# Patient Record
Sex: FEMALE | Race: BLACK OR AFRICAN AMERICAN | Employment: FULL TIME | URBAN - METROPOLITAN AREA
[De-identification: names, ages, dates, MRNs, and addresses within clinical notes are randomized per-mention and may not be internally consistent; named-entity substitution may affect disease eponyms.]

---

## 2018-09-29 ENCOUNTER — HOSPITAL ENCOUNTER (EMERGENCY)
Age: 38
Discharge: HOME | End: 2018-09-29
Attending: EMERGENCY MEDICINE
Payer: MEDICARE

## 2018-09-29 ENCOUNTER — APPOINTMENT (OUTPATIENT)
Dept: CT IMAGING | Age: 38
End: 2018-09-29
Payer: MEDICARE

## 2018-09-29 VITALS
HEART RATE: 88 BPM | DIASTOLIC BLOOD PRESSURE: 69 MMHG | SYSTOLIC BLOOD PRESSURE: 100 MMHG | OXYGEN SATURATION: 95 % | BODY MASS INDEX: 26.4 KG/M2 | RESPIRATION RATE: 16 BRPM | TEMPERATURE: 97.8 F | WEIGHT: 122 LBS

## 2018-09-29 DIAGNOSIS — N10 ACUTE PYELONEPHRITIS: Primary | ICD-10-CM

## 2018-09-29 LAB
ALBUMIN SERPL-MCNC: 4 G/DL (ref 3.5–5.2)
ALP BLD-CCNC: 106 U/L (ref 35–104)
ALT SERPL-CCNC: 18 U/L (ref 0–32)
ANION GAP SERPL CALCULATED.3IONS-SCNC: 14 MMOL/L (ref 7–16)
AST SERPL-CCNC: 17 U/L (ref 0–31)
BACTERIA: ABNORMAL /HPF
BASOPHILS ABSOLUTE: 0.13 E9/L (ref 0–0.2)
BASOPHILS RELATIVE PERCENT: 0.9 % (ref 0–2)
BILIRUB SERPL-MCNC: 0.8 MG/DL (ref 0–1.2)
BILIRUBIN URINE: NEGATIVE
BLOOD, URINE: ABNORMAL
BUN BLDV-MCNC: 12 MG/DL (ref 6–20)
CALCIUM SERPL-MCNC: 9.3 MG/DL (ref 8.6–10.2)
CHLORIDE BLD-SCNC: 101 MMOL/L (ref 98–107)
CHP ED QC CHECK: YES
CLARITY: ABNORMAL
CO2: 23 MMOL/L (ref 22–29)
COLOR: YELLOW
CREAT SERPL-MCNC: 0.6 MG/DL (ref 0.5–1)
EKG ATRIAL RATE: 101 BPM
EKG P AXIS: 50 DEGREES
EKG P-R INTERVAL: 144 MS
EKG Q-T INTERVAL: 328 MS
EKG QRS DURATION: 60 MS
EKG QTC CALCULATION (BAZETT): 425 MS
EKG R AXIS: 10 DEGREES
EKG T AXIS: 29 DEGREES
EKG VENTRICULAR RATE: 101 BPM
EOSINOPHILS ABSOLUTE: 0 E9/L (ref 0.05–0.5)
EOSINOPHILS RELATIVE PERCENT: 0.1 % (ref 0–6)
EPITHELIAL CELLS, UA: ABNORMAL /HPF
GFR AFRICAN AMERICAN: >60
GFR NON-AFRICAN AMERICAN: >60 ML/MIN/1.73
GLUCOSE BLD-MCNC: 238 MG/DL (ref 74–109)
GLUCOSE URINE: NEGATIVE MG/DL
HCT VFR BLD CALC: 40.7 % (ref 34–48)
HEMOGLOBIN: 12.8 G/DL (ref 11.5–15.5)
KETONES, URINE: 15 MG/DL
LACTIC ACID: 1.1 MMOL/L (ref 0.5–2.2)
LEUKOCYTE ESTERASE, URINE: ABNORMAL
LIPASE: 15 U/L (ref 13–60)
LYMPHOCYTES ABSOLUTE: 3.11 E9/L (ref 1.5–4)
LYMPHOCYTES RELATIVE PERCENT: 20.9 % (ref 20–42)
MCH RBC QN AUTO: 25 PG (ref 26–35)
MCHC RBC AUTO-ENTMCNC: 31.4 % (ref 32–34.5)
MCV RBC AUTO: 79.3 FL (ref 80–99.9)
MONOCYTES ABSOLUTE: 1.63 E9/L (ref 0.1–0.95)
MONOCYTES RELATIVE PERCENT: 11.3 % (ref 2–12)
NEUTROPHILS ABSOLUTE: 9.92 E9/L (ref 1.8–7.3)
NEUTROPHILS RELATIVE PERCENT: 67 % (ref 43–80)
NITRITE, URINE: POSITIVE
OVALOCYTES: ABNORMAL
PDW BLD-RTO: 14.6 FL (ref 11.5–15)
PH UA: 6 (ref 5–9)
PLATELET # BLD: 337 E9/L (ref 130–450)
PMV BLD AUTO: 10.4 FL (ref 7–12)
POIKILOCYTES: ABNORMAL
POTASSIUM SERPL-SCNC: 3.9 MMOL/L (ref 3.5–5)
PREGNANCY TEST URINE, POC: NEGATIVE
PROTEIN UA: 100 MG/DL
RBC # BLD: 5.13 E12/L (ref 3.5–5.5)
RBC UA: ABNORMAL /HPF (ref 0–2)
SODIUM BLD-SCNC: 138 MMOL/L (ref 132–146)
SPECIFIC GRAVITY UA: 1.02 (ref 1–1.03)
TOTAL PROTEIN: 8 G/DL (ref 6.4–8.3)
TROPONIN: <0.01 NG/ML (ref 0–0.03)
UROBILINOGEN, URINE: 4 E.U./DL
WBC # BLD: 14.8 E9/L (ref 4.5–11.5)
WBC UA: ABNORMAL /HPF (ref 0–5)

## 2018-09-29 PROCEDURE — 81001 URINALYSIS AUTO W/SCOPE: CPT

## 2018-09-29 PROCEDURE — 96374 THER/PROPH/DIAG INJ IV PUSH: CPT

## 2018-09-29 PROCEDURE — 6370000000 HC RX 637 (ALT 250 FOR IP): Performed by: EMERGENCY MEDICINE

## 2018-09-29 PROCEDURE — 87088 URINE BACTERIA CULTURE: CPT

## 2018-09-29 PROCEDURE — 2580000003 HC RX 258: Performed by: PHYSICIAN ASSISTANT

## 2018-09-29 PROCEDURE — 6360000002 HC RX W HCPCS: Performed by: PHYSICIAN ASSISTANT

## 2018-09-29 PROCEDURE — 94761 N-INVAS EAR/PLS OXIMETRY MLT: CPT

## 2018-09-29 PROCEDURE — 36415 COLL VENOUS BLD VENIPUNCTURE: CPT

## 2018-09-29 PROCEDURE — 96375 TX/PRO/DX INJ NEW DRUG ADDON: CPT

## 2018-09-29 PROCEDURE — 93005 ELECTROCARDIOGRAM TRACING: CPT | Performed by: PHYSICIAN ASSISTANT

## 2018-09-29 PROCEDURE — 83605 ASSAY OF LACTIC ACID: CPT

## 2018-09-29 PROCEDURE — 99284 EMERGENCY DEPT VISIT MOD MDM: CPT

## 2018-09-29 PROCEDURE — 87186 SC STD MICRODIL/AGAR DIL: CPT

## 2018-09-29 PROCEDURE — 6360000004 HC RX CONTRAST MEDICATION: Performed by: RADIOLOGY

## 2018-09-29 PROCEDURE — 80053 COMPREHEN METABOLIC PANEL: CPT

## 2018-09-29 PROCEDURE — 87040 BLOOD CULTURE FOR BACTERIA: CPT

## 2018-09-29 PROCEDURE — 74177 CT ABD & PELVIS W/CONTRAST: CPT

## 2018-09-29 PROCEDURE — 83690 ASSAY OF LIPASE: CPT

## 2018-09-29 PROCEDURE — 85025 COMPLETE CBC W/AUTO DIFF WBC: CPT

## 2018-09-29 PROCEDURE — 84484 ASSAY OF TROPONIN QUANT: CPT

## 2018-09-29 RX ORDER — 0.9 % SODIUM CHLORIDE 0.9 %
1000 INTRAVENOUS SOLUTION INTRAVENOUS ONCE
Status: COMPLETED | OUTPATIENT
Start: 2018-09-29 | End: 2018-09-29

## 2018-09-29 RX ORDER — MORPHINE SULFATE 4 MG/ML
4 INJECTION, SOLUTION INTRAMUSCULAR; INTRAVENOUS ONCE
Status: COMPLETED | OUTPATIENT
Start: 2018-09-29 | End: 2018-09-29

## 2018-09-29 RX ORDER — MELOXICAM 15 MG/1
15 TABLET ORAL DAILY
COMMUNITY
End: 2019-09-28

## 2018-09-29 RX ORDER — OXYCODONE HYDROCHLORIDE AND ACETAMINOPHEN 5; 325 MG/1; MG/1
1 TABLET ORAL ONCE
Status: COMPLETED | OUTPATIENT
Start: 2018-09-29 | End: 2018-09-29

## 2018-09-29 RX ORDER — KETOROLAC TROMETHAMINE 15 MG/ML
15 INJECTION, SOLUTION INTRAMUSCULAR; INTRAVENOUS ONCE
Status: COMPLETED | OUTPATIENT
Start: 2018-09-29 | End: 2018-09-29

## 2018-09-29 RX ORDER — ONDANSETRON 2 MG/ML
4 INJECTION INTRAMUSCULAR; INTRAVENOUS ONCE
Status: COMPLETED | OUTPATIENT
Start: 2018-09-29 | End: 2018-09-29

## 2018-09-29 RX ORDER — OXYCODONE HYDROCHLORIDE AND ACETAMINOPHEN 5; 325 MG/1; MG/1
1 TABLET ORAL EVERY 4 HOURS PRN
Qty: 20 TABLET | Refills: 0 | Status: SHIPPED | OUTPATIENT
Start: 2018-09-29 | End: 2018-10-06

## 2018-09-29 RX ORDER — LEVOFLOXACIN 750 MG/1
750 TABLET ORAL DAILY
Qty: 6 TABLET | Refills: 0 | Status: SHIPPED | OUTPATIENT
Start: 2018-09-29 | End: 2018-10-05

## 2018-09-29 RX ORDER — INSULIN GLARGINE 100 [IU]/ML
40 INJECTION, SOLUTION SUBCUTANEOUS NIGHTLY
COMMUNITY

## 2018-09-29 RX ORDER — DIPHENHYDRAMINE HYDROCHLORIDE 50 MG/ML
50 INJECTION INTRAMUSCULAR; INTRAVENOUS ONCE
Status: COMPLETED | OUTPATIENT
Start: 2018-09-29 | End: 2018-09-29

## 2018-09-29 RX ADMIN — MORPHINE SULFATE 4 MG: 4 INJECTION INTRAVENOUS at 15:49

## 2018-09-29 RX ADMIN — KETOROLAC TROMETHAMINE 15 MG: 15 INJECTION, SOLUTION INTRAMUSCULAR; INTRAVENOUS at 14:52

## 2018-09-29 RX ADMIN — ONDANSETRON HYDROCHLORIDE 4 MG: 2 INJECTION, SOLUTION INTRAMUSCULAR; INTRAVENOUS at 14:51

## 2018-09-29 RX ADMIN — CEFTRIAXONE 1 G: 1 INJECTION, POWDER, FOR SOLUTION INTRAMUSCULAR; INTRAVENOUS at 15:50

## 2018-09-29 RX ADMIN — SODIUM CHLORIDE 1000 ML: 9 INJECTION, SOLUTION INTRAVENOUS at 14:50

## 2018-09-29 RX ADMIN — OXYCODONE HYDROCHLORIDE AND ACETAMINOPHEN 1 TABLET: 5; 325 TABLET ORAL at 16:55

## 2018-09-29 RX ADMIN — IOPAMIDOL 80 ML: 755 INJECTION, SOLUTION INTRAVENOUS at 16:06

## 2018-09-29 RX ADMIN — DIPHENHYDRAMINE HYDROCHLORIDE 50 MG: 50 INJECTION, SOLUTION INTRAMUSCULAR; INTRAVENOUS at 15:48

## 2018-09-29 RX ADMIN — SODIUM CHLORIDE 1000 ML: 9 INJECTION, SOLUTION INTRAVENOUS at 15:46

## 2018-09-29 ASSESSMENT — PAIN SCALES - GENERAL
PAINLEVEL_OUTOF10: 5
PAINLEVEL_OUTOF10: 7
PAINLEVEL_OUTOF10: 10
PAINLEVEL_OUTOF10: 7
PAINLEVEL_OUTOF10: 10
PAINLEVEL_OUTOF10: 10

## 2018-09-29 ASSESSMENT — PAIN DESCRIPTION - ORIENTATION: ORIENTATION: RIGHT

## 2018-09-29 ASSESSMENT — PAIN DESCRIPTION - DESCRIPTORS: DESCRIPTORS: SHARP;ACHING

## 2018-09-29 ASSESSMENT — PAIN DESCRIPTION - LOCATION: LOCATION: FLANK

## 2018-09-29 ASSESSMENT — PAIN DESCRIPTION - PAIN TYPE: TYPE: ACUTE PAIN

## 2018-09-29 NOTE — ED PROVIDER NOTES
with the patient and discussed todays results, in addition to providing specific details for the plan of care and counseling regarding the diagnosis and prognosis. Questions are answered at this time and they are agreeable with the plan. Assessment      1. Acute pyelonephritis      Plan   As per Dr. Zo Whitten. Patient condition is fair    New Medications     New Prescriptions    LEVOFLOXACIN (LEVAQUIN) 750 MG TABLET    Take 1 tablet by mouth daily for 6 days    OXYCODONE-ACETAMINOPHEN (PERCOCET) 5-325 MG PER TABLET    Take 1 tablet by mouth every 4 hours as needed for Pain for up to 7 days. .     Electronically signed by Dary Beckman DO   DD: 9/29/18  **This report was transcribed using voice recognition software. Every effort was made to ensure accuracy; however, inadvertent computerized transcription errors may be present. END OF ED PROVIDER NOTE     ATTENDING PROVIDER ATTESTATION:     Rebecca Henley presented to the emergency department for evaluation of Flank Pain (pt states she has had flank pain since last evening, denies n/v/d, denies pain with urination)    I have reviewed and discussed the case, including pertinent history (medical, surgical, family and social) and exam findings with the PA and the Nurse assigned to Rebecca Henley. I have personally performed and/or participated in the history, exam, medical decision making, and procedures and agree with all pertinent clinical information. I have reviewed my findings and recommendations with Rebecca Henley and members of family present at the time of disposition. MDM: Supportive care, will obtain appropriate labs and imaging to assess patient's Flank Pain (pt states she has had flank pain since last evening, denies n/v/d, denies pain with urination)       My findings/plan: The encounter diagnosis was Acute pyelonephritis.   New Prescriptions    LEVOFLOXACIN (LEVAQUIN) 750 MG TABLET    Take 1 tablet by mouth daily for 6 days

## 2018-10-01 ENCOUNTER — HOSPITAL ENCOUNTER (EMERGENCY)
Age: 38
Discharge: HOME | End: 2018-10-02
Attending: EMERGENCY MEDICINE
Payer: MEDICARE

## 2018-10-01 VITALS
HEIGHT: 56 IN | DIASTOLIC BLOOD PRESSURE: 79 MMHG | TEMPERATURE: 98.2 F | OXYGEN SATURATION: 100 % | BODY MASS INDEX: 27.44 KG/M2 | WEIGHT: 122 LBS | SYSTOLIC BLOOD PRESSURE: 119 MMHG | RESPIRATION RATE: 16 BRPM | HEART RATE: 72 BPM

## 2018-10-01 DIAGNOSIS — N12 PYELONEPHRITIS: Primary | ICD-10-CM

## 2018-10-01 LAB
ALBUMIN SERPL-MCNC: 3.5 G/DL (ref 3.5–5.2)
ALP BLD-CCNC: 95 U/L (ref 35–104)
ALT SERPL-CCNC: 15 U/L (ref 0–32)
ANION GAP SERPL CALCULATED.3IONS-SCNC: 13 MMOL/L (ref 7–16)
AST SERPL-CCNC: 14 U/L (ref 0–31)
BASOPHILS ABSOLUTE: 0.02 E9/L (ref 0–0.2)
BASOPHILS RELATIVE PERCENT: 0.2 % (ref 0–2)
BILIRUB SERPL-MCNC: 0.3 MG/DL (ref 0–1.2)
BILIRUBIN URINE: NEGATIVE
BLOOD, URINE: NEGATIVE
BUN BLDV-MCNC: 10 MG/DL (ref 6–20)
CALCIUM SERPL-MCNC: 9.1 MG/DL (ref 8.6–10.2)
CHLORIDE BLD-SCNC: 103 MMOL/L (ref 98–107)
CHP ED QC CHECK: NORMAL
CLARITY: ABNORMAL
CO2: 24 MMOL/L (ref 22–29)
COLOR: YELLOW
CREAT SERPL-MCNC: 0.6 MG/DL (ref 0.5–1)
EOSINOPHILS ABSOLUTE: 0.03 E9/L (ref 0.05–0.5)
EOSINOPHILS RELATIVE PERCENT: 0.3 % (ref 0–6)
GFR AFRICAN AMERICAN: >60
GFR NON-AFRICAN AMERICAN: >60 ML/MIN/1.73
GLUCOSE BLD-MCNC: 134 MG/DL (ref 74–109)
GLUCOSE BLD-MCNC: 138 MG/DL
GLUCOSE URINE: NEGATIVE MG/DL
HCT VFR BLD CALC: 32.6 % (ref 34–48)
HEMOGLOBIN: 10.3 G/DL (ref 11.5–15.5)
IMMATURE GRANULOCYTES #: 0.04 E9/L
IMMATURE GRANULOCYTES %: 0.5 % (ref 0–5)
KETONES, URINE: 40 MG/DL
LACTIC ACID: 0.9 MMOL/L (ref 0.5–2.2)
LEUKOCYTE ESTERASE, URINE: NEGATIVE
LIPASE: 15 U/L (ref 13–60)
LYMPHOCYTES ABSOLUTE: 2.11 E9/L (ref 1.5–4)
LYMPHOCYTES RELATIVE PERCENT: 24.3 % (ref 20–42)
MCH RBC QN AUTO: 25.1 PG (ref 26–35)
MCHC RBC AUTO-ENTMCNC: 31.6 % (ref 32–34.5)
MCV RBC AUTO: 79.3 FL (ref 80–99.9)
MONOCYTES ABSOLUTE: 0.65 E9/L (ref 0.1–0.95)
MONOCYTES RELATIVE PERCENT: 7.5 % (ref 2–12)
NEUTROPHILS ABSOLUTE: 5.83 E9/L (ref 1.8–7.3)
NEUTROPHILS RELATIVE PERCENT: 67.2 % (ref 43–80)
NITRITE, URINE: NEGATIVE
ORGANISM: ABNORMAL
PDW BLD-RTO: 14.6 FL (ref 11.5–15)
PH UA: 6.5 (ref 5–9)
PH VENOUS: 7.41 (ref 7.3–7.42)
PLATELET # BLD: 342 E9/L (ref 130–450)
PMV BLD AUTO: 10.6 FL (ref 7–12)
POC ANION GAP: 15
POC BUN: 9
POC CHLORIDE: 104
POC CO2: 25
POC CREATININE: 0.5
POC POTASSIUM: NORMAL
POC SODIUM: 139
POTASSIUM SERPL-SCNC: 3.8 MMOL/L (ref 3.5–5)
PROTEIN UA: NEGATIVE MG/DL
RBC # BLD: 4.11 E12/L (ref 3.5–5.5)
SODIUM BLD-SCNC: 140 MMOL/L (ref 132–146)
SPECIFIC GRAVITY UA: 1.02 (ref 1–1.03)
TOTAL PROTEIN: 7.2 G/DL (ref 6.4–8.3)
URINE CULTURE, ROUTINE: ABNORMAL
URINE CULTURE, ROUTINE: ABNORMAL
UROBILINOGEN, URINE: 4 E.U./DL
WBC # BLD: 8.7 E9/L (ref 4.5–11.5)

## 2018-10-01 PROCEDURE — 82800 BLOOD PH: CPT

## 2018-10-01 PROCEDURE — 83690 ASSAY OF LIPASE: CPT

## 2018-10-01 PROCEDURE — 80053 COMPREHEN METABOLIC PANEL: CPT

## 2018-10-01 PROCEDURE — 85025 COMPLETE CBC W/AUTO DIFF WBC: CPT

## 2018-10-01 PROCEDURE — 99284 EMERGENCY DEPT VISIT MOD MDM: CPT

## 2018-10-01 PROCEDURE — 2580000003 HC RX 258: Performed by: EMERGENCY MEDICINE

## 2018-10-01 PROCEDURE — 6360000002 HC RX W HCPCS: Performed by: EMERGENCY MEDICINE

## 2018-10-01 PROCEDURE — 81001 URINALYSIS AUTO W/SCOPE: CPT

## 2018-10-01 PROCEDURE — 96374 THER/PROPH/DIAG INJ IV PUSH: CPT

## 2018-10-01 PROCEDURE — 83605 ASSAY OF LACTIC ACID: CPT

## 2018-10-01 PROCEDURE — 82010 KETONE BODYS QUAN: CPT

## 2018-10-01 PROCEDURE — 36415 COLL VENOUS BLD VENIPUNCTURE: CPT

## 2018-10-01 PROCEDURE — 96375 TX/PRO/DX INJ NEW DRUG ADDON: CPT

## 2018-10-01 RX ORDER — SODIUM CHLORIDE 9 MG/ML
1000 INJECTION, SOLUTION INTRAVENOUS ONCE
Status: COMPLETED | OUTPATIENT
Start: 2018-10-01 | End: 2018-10-02

## 2018-10-01 RX ORDER — KETOROLAC TROMETHAMINE 30 MG/ML
30 INJECTION, SOLUTION INTRAMUSCULAR; INTRAVENOUS ONCE
Status: COMPLETED | OUTPATIENT
Start: 2018-10-01 | End: 2018-10-01

## 2018-10-01 RX ORDER — ONDANSETRON 2 MG/ML
4 INJECTION INTRAMUSCULAR; INTRAVENOUS ONCE
Status: COMPLETED | OUTPATIENT
Start: 2018-10-01 | End: 2018-10-01

## 2018-10-01 RX ADMIN — SODIUM CHLORIDE 1000 ML: 9 INJECTION, SOLUTION INTRAVENOUS at 22:56

## 2018-10-01 RX ADMIN — KETOROLAC TROMETHAMINE 30 MG: 30 INJECTION, SOLUTION INTRAMUSCULAR; INTRAVENOUS at 22:56

## 2018-10-01 RX ADMIN — ONDANSETRON 4 MG: 2 INJECTION INTRAMUSCULAR; INTRAVENOUS at 22:56

## 2018-10-01 RX ADMIN — CEFTRIAXONE 2 G: 2 INJECTION, POWDER, FOR SOLUTION INTRAMUSCULAR; INTRAVENOUS at 23:51

## 2018-10-01 ASSESSMENT — ENCOUNTER SYMPTOMS
COUGH: 0
SORE THROAT: 0
ABDOMINAL DISTENTION: 0
SHORTNESS OF BREATH: 0
DIARRHEA: 0
EYE REDNESS: 0
VOMITING: 1
EYE PAIN: 0
BACK PAIN: 0
ABDOMINAL PAIN: 1
EYE DISCHARGE: 0
SINUS PRESSURE: 0
NAUSEA: 1
WHEEZING: 0

## 2018-10-01 ASSESSMENT — PAIN SCALES - GENERAL: PAINLEVEL_OUTOF10: 10

## 2018-10-02 LAB
BACTERIA: ABNORMAL /HPF
BETA-HYDROXYBUTYRATE: 0.62 MMOL/L (ref 0.02–0.27)
EPITHELIAL CELLS, UA: ABNORMAL /HPF
RBC UA: ABNORMAL /HPF (ref 0–2)
WBC UA: ABNORMAL /HPF (ref 0–5)

## 2018-10-02 PROCEDURE — 6360000002 HC RX W HCPCS: Performed by: EMERGENCY MEDICINE

## 2018-10-02 PROCEDURE — 96375 TX/PRO/DX INJ NEW DRUG ADDON: CPT

## 2018-10-02 RX ORDER — MORPHINE SULFATE 4 MG/ML
4 INJECTION, SOLUTION INTRAMUSCULAR; INTRAVENOUS ONCE
Status: COMPLETED | OUTPATIENT
Start: 2018-10-02 | End: 2018-10-02

## 2018-10-02 RX ORDER — ONDANSETRON 4 MG/1
4 TABLET, ORALLY DISINTEGRATING ORAL EVERY 8 HOURS PRN
Qty: 10 TABLET | Refills: 0 | Status: SHIPPED | OUTPATIENT
Start: 2018-10-02 | End: 2019-10-02

## 2018-10-02 RX ORDER — HYDROCODONE BITARTRATE AND ACETAMINOPHEN 5; 325 MG/1; MG/1
1 TABLET ORAL EVERY 6 HOURS PRN
Qty: 12 TABLET | Refills: 0 | Status: SHIPPED | OUTPATIENT
Start: 2018-10-02 | End: 2018-10-05

## 2018-10-02 RX ADMIN — MORPHINE SULFATE 4 MG: 4 INJECTION INTRAVENOUS at 01:11

## 2018-10-02 ASSESSMENT — PAIN SCALES - GENERAL: PAINLEVEL_OUTOF10: 6

## 2018-10-02 NOTE — ED PROVIDER NOTES
Patient is a 45 y/o female who presents to the ED with abdominal pain, nausea and vomiting. Patient states she was seen here 2 days ago for the same. She states her pain is worsening and she is vomiting and unable to hold anything down. She has not taken her antibiotics due to the vomiting. She denies any fever. Currently, her pain is 10/10. The history is provided by the patient. Review of Systems   Constitutional: Negative for chills and fever. HENT: Negative for ear pain, sinus pressure and sore throat. Eyes: Negative for pain, discharge and redness. Respiratory: Negative for cough, shortness of breath and wheezing. Cardiovascular: Negative for chest pain. Gastrointestinal: Positive for abdominal pain, nausea and vomiting. Negative for abdominal distention and diarrhea. Genitourinary: Negative for dysuria and frequency. Musculoskeletal: Negative for arthralgias and back pain. Skin: Negative for rash and wound. Neurological: Negative for weakness and headaches. Hematological: Negative for adenopathy. All other systems reviewed and are negative. Physical Exam   Constitutional: She is oriented to person, place, and time. She appears well-developed and well-nourished. No distress. HENT:   Head: Normocephalic and atraumatic. Right Ear: External ear normal.   Left Ear: External ear normal.   Nose: Nose normal.   Mouth/Throat: Oropharynx is clear and moist.   Eyes: Pupils are equal, round, and reactive to light. Conjunctivae are normal.   Neck: Normal range of motion. Neck supple. Cardiovascular: Normal rate, regular rhythm, normal heart sounds and intact distal pulses. No murmur heard. Pulmonary/Chest: Effort normal and breath sounds normal. No respiratory distress. She has no wheezes. She has no rales. Abdominal: Soft. Bowel sounds are normal. She exhibits no distension. There is tenderness (RLQ). There is no rebound and no guarding.    Musculoskeletal: Normal range 3.50 - 5.50 E12/L    Hemoglobin 10.3 (L) 11.5 - 15.5 g/dL    Hematocrit 32.6 (L) 34.0 - 48.0 %    MCV 79.3 (L) 80.0 - 99.9 fL    MCH 25.1 (L) 26.0 - 35.0 pg    MCHC 31.6 (L) 32.0 - 34.5 %    RDW 14.6 11.5 - 15.0 fL    Platelets 894 652 - 892 E9/L    MPV 10.6 7.0 - 12.0 fL    Neutrophils % 67.2 43.0 - 80.0 %    Immature Granulocytes % 0.5 0.0 - 5.0 %    Lymphocytes % 24.3 20.0 - 42.0 %    Monocytes % 7.5 2.0 - 12.0 %    Eosinophils % 0.3 0.0 - 6.0 %    Basophils % 0.2 0.0 - 2.0 %    Neutrophils # 5.83 1.80 - 7.30 E9/L    Immature Granulocytes # 0.04 E9/L    Lymphocytes # 2.11 1.50 - 4.00 E9/L    Monocytes # 0.65 0.10 - 0.95 E9/L    Eosinophils # 0.03 (L) 0.05 - 0.50 E9/L    Basophils # 0.02 0.00 - 0.20 E9/L   Comprehensive Metabolic Panel   Result Value Ref Range    Sodium 140 132 - 146 mmol/L    Potassium 3.8 3.5 - 5.0 mmol/L    Chloride 103 98 - 107 mmol/L    CO2 24 22 - 29 mmol/L    Anion Gap 13 7 - 16 mmol/L    Glucose 134 (H) 74 - 109 mg/dL    BUN 10 6 - 20 mg/dL    CREATININE 0.6 0.5 - 1.0 mg/dL    GFR Non-African American >60 >=60 mL/min/1.73    GFR African American >60     Calcium 9.1 8.6 - 10.2 mg/dL    Total Protein 7.2 6.4 - 8.3 g/dL    Alb 3.5 3.5 - 5.2 g/dL    Total Bilirubin 0.3 0.0 - 1.2 mg/dL    Alkaline Phosphatase 95 35 - 104 U/L    ALT 15 0 - 32 U/L    AST 14 0 - 31 U/L   Lipase   Result Value Ref Range    Lipase 15 13 - 60 U/L   Lactic Acid, Plasma   Result Value Ref Range    Lactic Acid 0.9 0.5 - 2.2 mmol/L   pH, venous   Result Value Ref Range    pH, [place] 7.41 7.30 - 7.42   Beta-Hydroxybutyrate   Result Value Ref Range    Beta-Hydroxybutyrate 0.62 (H) 0.02 - 0.27 mmol/L   Microscopic Urinalysis   Result Value Ref Range    WBC, UA 2-5 0 - 5 /HPF    RBC, UA 0-1 0 - 2 /HPF    Epi Cells FEW /HPF    Bacteria, UA RARE (A) /HPF   POCT chem basic w/ ICA   Result Value Ref Range    POC BUN 9     POC Chloride 104     POC Creatinine 0.5     POC Anion Gap 15     POC Glucose 138     POC Potassium      POC Sodium 139     POC CO2 25     QC OK? Yes. Radiology:  No orders to display       ------------------------- NURSING NOTES AND VITALS REVIEWED ---------------------------  Date / Time Roomed:  10/1/2018 10:19 PM  ED Bed Assignment:  26/26    The nursing notes within the ED encounter and vital signs as below have been reviewed. /79   Pulse 72   Temp 98.2 °F (36.8 °C) (Oral)   Resp 16   Ht 4' 8\" (1.422 m)   Wt 122 lb (55.3 kg)   SpO2 100%   BMI 27.35 kg/m²   Oxygen Saturation Interpretation: Normal      ------------------------------------------ PROGRESS NOTES ------------------------------------------  I have spoken with the patient and discussed todays results, in addition to providing specific details for the plan of care and counseling regarding the diagnosis and prognosis. Their questions are answered at this time and they are agreeable with the plan. I discussed at length with them reasons for immediate return here for re evaluation. They will followup with primary care by calling their office tomorrow. --------------------------------- ADDITIONAL PROVIDER NOTES ---------------------------------  At this time the patient is without objective evidence of an acute process requiring hospitalization or inpatient management. They have remained hemodynamically stable throughout their entire ED visit and are stable for discharge with outpatient follow-up. The plan has been discussed in detail and they are aware of the specific conditions for emergent return, as well as the importance of follow-up. New Prescriptions    HYDROCODONE-ACETAMINOPHEN (NORCO) 5-325 MG PER TABLET    Take 1 tablet by mouth every 6 hours as needed for Pain for up to 3 days. Intended supply: 3 days. Take lowest dose possible to manage pain. ONDANSETRON (ZOFRAN ODT) 4 MG DISINTEGRATING TABLET    Take 1 tablet by mouth every 8 hours as needed for Nausea or Vomiting       Diagnosis:  1.  Pyelonephritis

## 2018-10-04 LAB
BLOOD CULTURE, ROUTINE: NORMAL
CULTURE, BLOOD 2: NORMAL

## 2019-09-08 ENCOUNTER — APPOINTMENT (OUTPATIENT)
Dept: GENERAL RADIOLOGY | Age: 39
End: 2019-09-08
Payer: MEDICARE

## 2019-09-08 ENCOUNTER — HOSPITAL ENCOUNTER (EMERGENCY)
Age: 39
Discharge: HOME | End: 2019-09-08
Payer: MEDICARE

## 2019-09-08 VITALS
RESPIRATION RATE: 18 BRPM | HEIGHT: 56 IN | HEART RATE: 97 BPM | OXYGEN SATURATION: 100 % | DIASTOLIC BLOOD PRESSURE: 85 MMHG | TEMPERATURE: 98.6 F | WEIGHT: 120 LBS | SYSTOLIC BLOOD PRESSURE: 119 MMHG | BODY MASS INDEX: 26.99 KG/M2
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
## 2019-09-08 DIAGNOSIS — M25.552 LEFT HIP PAIN: ICD-10-CM

## 2019-09-08 DIAGNOSIS — S49.91XA INJURY OF BOTH SHOULDERS, INITIAL ENCOUNTER: ICD-10-CM

## 2019-09-08 DIAGNOSIS — M25.551 RIGHT HIP PAIN: ICD-10-CM

## 2019-09-08 DIAGNOSIS — S49.92XA INJURY OF BOTH SHOULDERS, INITIAL ENCOUNTER: ICD-10-CM

## 2019-09-08 PROCEDURE — 6360000002 HC RX W HCPCS: Performed by: PHYSICIAN ASSISTANT

## 2019-09-08 PROCEDURE — 72100 X-RAY EXAM L-S SPINE 2/3 VWS: CPT

## 2019-09-08 PROCEDURE — 96372 THER/PROPH/DIAG INJ SC/IM: CPT

## 2019-09-08 PROCEDURE — 99283 EMERGENCY DEPT VISIT LOW MDM: CPT

## 2019-09-08 PROCEDURE — 73030 X-RAY EXAM OF SHOULDER: CPT

## 2019-09-08 PROCEDURE — 73502 X-RAY EXAM HIP UNI 2-3 VIEWS: CPT

## 2019-09-08 RX ORDER — KETOROLAC TROMETHAMINE 30 MG/ML
30 INJECTION, SOLUTION INTRAMUSCULAR; INTRAVENOUS ONCE
Status: COMPLETED | OUTPATIENT
Start: 2019-09-08 | End: 2019-09-08

## 2019-09-08 RX ORDER — LIDOCAINE 50 MG/G
1 PATCH TOPICAL DAILY
Qty: 10 PATCH | Refills: 0 | Status: SHIPPED | OUTPATIENT
Start: 2019-09-08 | End: 2019-09-18

## 2019-09-08 RX ADMIN — KETOROLAC TROMETHAMINE 30 MG: 30 INJECTION, SOLUTION INTRAMUSCULAR at 16:23

## 2019-09-08 ASSESSMENT — PAIN DESCRIPTION - PAIN TYPE: TYPE: ACUTE PAIN

## 2019-09-08 ASSESSMENT — PAIN SCALES - GENERAL
PAINLEVEL_OUTOF10: 10
PAINLEVEL_OUTOF10: 10

## 2019-09-08 NOTE — ED PROVIDER NOTES
[facility]     Department of Emergency Medicine   ED  Provider Note  Admit Date/RoomTime: 9/8/2019  4:05 PM  ED Room: 36/36  Chief Complaint   Fall Barnhart Ke yesterday onto right hip, denies head injury or LOC); Shoulder Pain; and Hip Pain (Bilateral)    History of Present Illness   Source of history provided by:  patient. History/Exam Limitations: none. Annalisa Mejia is a 43 y.o. old female who has a past medical history of:   Past Medical History:   Diagnosis Date    Birth defect     Diabetes mellitus ([facility] [address]     presents to the emergency department by private vehicle, for a fall which occured last night around 2:00 in the morning. Patient states that she was hanging pictures in her house and fell off of a ladder accident with prior complaints of nothing. Patient denies any head injury or loss of consciousness. Patient stated age that she started to have bilateral hip and shoulder pain. 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 Patient has not taken anything for this. The patients tetanus status is unknown. Since onset the symptoms have been moderate in degree. Her pain is aggraveated by movement, use and palpation and relieved by. She denies any head injury, loss of consciousness, neck pain, chest pain, abdominal pain, numbness or weakness. .  ROS    Pertinent positives and negatives are stated within HPI, all other systems reviewed and are negative. Past Surgical History:   Procedure Laterality Date    HIP SURGERY      HYSTERECTOMY     Social History:  reports that she has been smoking. She has been smoking about 0.25 packs per day. She has never used smokeless tobacco. She reports that she drinks alcohol. She reports that she does not use drugs. Family History: family history is not on file. Allergies: Patient has no known allergies.     Physical Exam   Oxygen Saturation Interpretation: Normal.  ED Triage Vitals [09/08/19 1602]   BP Temp Temp Source Pulse Resp SpO2 Height Weight   119/85 98.6 °F (37 °C) Oral 97 18 100 % 4' 8\" (1.422 m) 120 lb (54.4 kg)       Physical Exam  · Constitutional:  Alert, development consistent with age. NAD  · HEENT:  NC/NT. Airway patent. No retropharyngeal or peritonsillar abscess. · Neck:  No midline or paravertebral tenderness. Normal ROM. Supple. · Chest:  Symmetrical without visible rash or tenderness. · Respiratory:  Lungs Clear to auscultation and breath sounds equal.  · CV:  Regular rate and rhythm, normal heart sounds, without pathological murmurs, ectopy, gallops, or rubs. · GI:  Abdomen Soft, nontender, good bowel sounds. No firm or pulsatile mass. · Pelvis:  Stable, nontender to palpation. · Back:  No midline or paravertebral tenderness. No costovertebral tenderness. No step-off deformities. No evidence of trauma. No erythema, edema or evidence of infection. · Extremities: No tenderness or edema noted. She had some tenderness bilaterally in her scapulas but no step-off deformities noted at the [facility] joint bilaterally. Patient is neurovascular and sensory intact. Patient can abduct and adduct bilateral arms. · Integument:  Normal turgor. Warm, dry, without visible rash, unless noted elsewhere. · Lymphatic: no lymphadenopathy noted  · Neurological:  Oriented x3, GCS 15. Motor functions intact. Lab / Imaging Results   (All laboratory and radiology results have been personally reviewed by myself)  Labs:  No results found for this visit on 09/08/19. Imaging: All Radiology results interpreted by Radiologist unless otherwise noted. 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 This is secondary to severe osteoarthritic changes   and old trauma.       XR HIP LEFT (2-3 VIEWS)   Final Result      NO ACUTE FRACTURE OR DISLOCATION

## 2019-09-28 ENCOUNTER — HOSPITAL ENCOUNTER (EMERGENCY)
Age: 39
Discharge: HOME | End: 2019-09-28
Attending: EMERGENCY MEDICINE
Payer: MEDICARE

## 2019-09-28 VITALS
BODY MASS INDEX: 29.69 KG/M2 | OXYGEN SATURATION: 100 % | WEIGHT: 132 LBS | SYSTOLIC BLOOD PRESSURE: 121 MMHG | HEART RATE: 86 BPM | RESPIRATION RATE: 14 BRPM | DIASTOLIC BLOOD PRESSURE: 80 MMHG | HEIGHT: 56 IN | TEMPERATURE: 98.3 F

## 2019-09-28 DIAGNOSIS — Z86.39 HISTORY OF DIABETES MELLITUS: ICD-10-CM

## 2019-09-28 DIAGNOSIS — L02.219 CUTANEOUS ABSCESS OF TRUNK, UNSPECIFIED SITE OF TRUNK: Primary | ICD-10-CM

## 2019-09-28 PROCEDURE — 87075 CULTR BACTERIA EXCEPT BLOOD: CPT

## 2019-09-28 PROCEDURE — 87205 SMEAR GRAM STAIN: CPT

## 2019-09-28 PROCEDURE — 10060 I&D ABSCESS SIMPLE/SINGLE: CPT

## 2019-09-28 PROCEDURE — 99283 EMERGENCY DEPT VISIT LOW MDM: CPT

## 2019-09-28 PROCEDURE — 2500000003 HC RX 250 WO HCPCS: Performed by: EMERGENCY MEDICINE

## 2019-09-28 PROCEDURE — 87070 CULTURE OTHR SPECIMN AEROBIC: CPT

## 2019-09-28 PROCEDURE — 87186 SC STD MICRODIL/AGAR DIL: CPT

## 2019-09-28 RX ORDER — LIDOCAINE HYDROCHLORIDE AND EPINEPHRINE 10; 10 MG/ML; UG/ML
20 INJECTION, SOLUTION INFILTRATION; PERINEURAL ONCE
Status: COMPLETED | OUTPATIENT
Start: 2019-09-28 | End: 2019-09-28

## 2019-09-28 RX ORDER — CEPHALEXIN 500 MG/1
500 CAPSULE ORAL 3 TIMES DAILY
Qty: 21 CAPSULE | Refills: 0 | Status: SHIPPED | OUTPATIENT
Start: 2019-09-28 | End: 2019-10-05

## 2019-09-28 RX ADMIN — LIDOCAINE HYDROCHLORIDE AND EPINEPHRINE 20 ML: 10; 10 INJECTION, SOLUTION INFILTRATION; PERINEURAL at 18:08

## 2019-09-28 ASSESSMENT — ENCOUNTER SYMPTOMS
EYE REDNESS: 0
SORE THROAT: 0
SINUS PRESSURE: 0
CONSTIPATION: 0
VOMITING: 0
SHORTNESS OF BREATH: 0
ABDOMINAL DISTENTION: 0
EYE DISCHARGE: 0
EYE PAIN: 0
BACK PAIN: 0
WHEEZING: 0
BLOOD IN STOOL: 0
COUGH: 0
DIARRHEA: 0
RHINORRHEA: 0
NAUSEA: 0

## 2019-09-28 ASSESSMENT — PAIN DESCRIPTION - PAIN TYPE: TYPE: ACUTE PAIN

## 2019-09-28 ASSESSMENT — PAIN DESCRIPTION - ORIENTATION: ORIENTATION: LEFT

## 2019-09-28 ASSESSMENT — PAIN DESCRIPTION - DESCRIPTORS: DESCRIPTORS: BURNING

## 2019-09-28 ASSESSMENT — PAIN DESCRIPTION - LOCATION: LOCATION: HIP

## 2019-09-28 ASSESSMENT — PAIN SCALES - GENERAL: PAINLEVEL_OUTOF10: 10

## 2019-09-28 ASSESSMENT — PAIN DESCRIPTION - FREQUENCY: FREQUENCY: CONTINUOUS

## 2019-09-28 NOTE — ED PROVIDER NOTES
and prognosis. Their questions are answered at this time and they are agreeable with the plan. I discussed at length with them reasons for immediate return here for re evaluation. They will followup with their primary care physician by calling their office tomorrow. --------------------------------- ADDITIONAL PROVIDER NOTES ---------------------------------  At this time the patient is without objective evidence of an acute process requiring hospitalization or inpatient management. They have remained hemodynamically stable throughout their entire ED visit and are stable for discharge with outpatient follow-up. The plan has been discussed in detail and they are aware of the specific conditions for emergent return, as well as the importance of follow-up. New Prescriptions    CEPHALEXIN (KEFLEX) 500 MG CAPSULE    Take 1 capsule by mouth 3 times daily for 7 days       Diagnosis:  1. Cutaneous abscess of trunk, unspecified site of trunk    2. History of diabetes mellitus        Disposition:  Patient's disposition: Discharge to home  Patient's condition is stable.          Almas Palmer DO  Resident  09/28/19 1949

## 2019-09-30 LAB
ANAEROBIC CULTURE: NORMAL
GRAM STAIN RESULT: ABNORMAL
ORGANISM: ABNORMAL
WOUND/ABSCESS: ABNORMAL

## 2020-05-08 ENCOUNTER — HOSPITAL ENCOUNTER (EMERGENCY)
Age: 40
Discharge: HOME | End: 2020-05-08
Attending: EMERGENCY MEDICINE
Payer: MEDICARE

## 2020-05-08 ENCOUNTER — APPOINTMENT (OUTPATIENT)
Dept: CT IMAGING | Age: 40
End: 2020-05-08
Payer: MEDICARE

## 2020-05-08 VITALS
OXYGEN SATURATION: 98 % | SYSTOLIC BLOOD PRESSURE: 132 MMHG | WEIGHT: 132 LBS | HEIGHT: 56 IN | BODY MASS INDEX: 29.69 KG/M2 | HEART RATE: 87 BPM | RESPIRATION RATE: 18 BRPM | DIASTOLIC BLOOD PRESSURE: 76 MMHG | TEMPERATURE: 98 F

## 2020-05-08 LAB
ALBUMIN SERPL-MCNC: 4.4 G/DL (ref 3.5–5.2)
ALP BLD-CCNC: 106 U/L (ref 35–104)
ALT SERPL-CCNC: 14 U/L (ref 0–32)
ANION GAP SERPL CALCULATED.3IONS-SCNC: 14 MMOL/L (ref 7–16)
AST SERPL-CCNC: 14 U/L (ref 0–31)
BACTERIA: ABNORMAL /HPF
BASOPHILS ABSOLUTE: 0.03 E9/L (ref 0–0.2)
BASOPHILS RELATIVE PERCENT: 0.3 % (ref 0–2)
BILIRUB SERPL-MCNC: 0.3 MG/DL (ref 0–1.2)
BILIRUBIN URINE: NEGATIVE
BLOOD, URINE: ABNORMAL
BUN BLDV-MCNC: 10 MG/DL (ref 6–20)
CALCIUM SERPL-MCNC: 9.7 MG/DL (ref 8.6–10.2)
CHLORIDE BLD-SCNC: 95 MMOL/L (ref 98–107)
CLARITY: ABNORMAL
CO2: 25 MMOL/L (ref 22–29)
COLOR: YELLOW
CREAT SERPL-MCNC: 0.7 MG/DL (ref 0.5–1)
EOSINOPHILS ABSOLUTE: 0.04 E9/L (ref 0.05–0.5)
EOSINOPHILS RELATIVE PERCENT: 0.4 % (ref 0–6)
EPITHELIAL CELLS, UA: ABNORMAL /HPF
GFR AFRICAN AMERICAN: >60
GFR NON-AFRICAN AMERICAN: >60 ML/MIN/1.73
GLUCOSE BLD-MCNC: 395 MG/DL (ref 74–99)
GLUCOSE URINE: >=1000 MG/DL
HCT VFR BLD CALC: 42 % (ref 34–48)
HEMOGLOBIN: 13.3 G/DL (ref 11.5–15.5)
IMMATURE GRANULOCYTES #: 0.03 E9/L
IMMATURE GRANULOCYTES %: 0.3 % (ref 0–5)
KETONES, URINE: NEGATIVE MG/DL
LACTIC ACID: 1.8 MMOL/L (ref 0.5–2.2)
LEUKOCYTE ESTERASE, URINE: NEGATIVE
LIPASE: 28 U/L (ref 13–60)
LYMPHOCYTES ABSOLUTE: 2.13 E9/L (ref 1.5–4)
LYMPHOCYTES RELATIVE PERCENT: 19.7 % (ref 20–42)
MCH RBC QN AUTO: 26 PG (ref 26–35)
MCHC RBC AUTO-ENTMCNC: 31.7 % (ref 32–34.5)
MCV RBC AUTO: 82 FL (ref 80–99.9)
MONOCYTES ABSOLUTE: 0.93 E9/L (ref 0.1–0.95)
MONOCYTES RELATIVE PERCENT: 8.6 % (ref 2–12)
NEUTROPHILS ABSOLUTE: 7.64 E9/L (ref 1.8–7.3)
NEUTROPHILS RELATIVE PERCENT: 70.7 % (ref 43–80)
NITRITE, URINE: NEGATIVE
PDW BLD-RTO: 13.7 FL (ref 11.5–15)
PH UA: 6.5 (ref 5–9)
PLATELET # BLD: 309 E9/L (ref 130–450)
PMV BLD AUTO: 10.9 FL (ref 7–12)
POTASSIUM REFLEX MAGNESIUM: 4.2 MMOL/L (ref 3.5–5)
PROTEIN UA: NEGATIVE MG/DL
RBC # BLD: 5.12 E12/L (ref 3.5–5.5)
RBC UA: ABNORMAL /HPF (ref 0–2)
SODIUM BLD-SCNC: 134 MMOL/L (ref 132–146)
SPECIFIC GRAVITY UA: 1.01 (ref 1–1.03)
TOTAL PROTEIN: 7.8 G/DL (ref 6.4–8.3)
UROBILINOGEN, URINE: 0.2 E.U./DL
WBC # BLD: 10.8 E9/L (ref 4.5–11.5)
WBC UA: ABNORMAL /HPF (ref 0–5)

## 2020-05-08 PROCEDURE — 6360000002 HC RX W HCPCS: Performed by: STUDENT IN AN ORGANIZED HEALTH CARE EDUCATION/TRAINING PROGRAM

## 2020-05-08 PROCEDURE — 99284 EMERGENCY DEPT VISIT MOD MDM: CPT

## 2020-05-08 PROCEDURE — 83605 ASSAY OF LACTIC ACID: CPT

## 2020-05-08 PROCEDURE — 96375 TX/PRO/DX INJ NEW DRUG ADDON: CPT

## 2020-05-08 PROCEDURE — 2580000003 HC RX 258: Performed by: STUDENT IN AN ORGANIZED HEALTH CARE EDUCATION/TRAINING PROGRAM

## 2020-05-08 PROCEDURE — 85025 COMPLETE CBC W/AUTO DIFF WBC: CPT

## 2020-05-08 PROCEDURE — 80053 COMPREHEN METABOLIC PANEL: CPT

## 2020-05-08 PROCEDURE — 83690 ASSAY OF LIPASE: CPT

## 2020-05-08 PROCEDURE — 81001 URINALYSIS AUTO W/SCOPE: CPT

## 2020-05-08 PROCEDURE — 96374 THER/PROPH/DIAG INJ IV PUSH: CPT

## 2020-05-08 PROCEDURE — 74176 CT ABD & PELVIS W/O CONTRAST: CPT

## 2020-05-08 RX ORDER — 0.9 % SODIUM CHLORIDE 0.9 %
500 INTRAVENOUS SOLUTION INTRAVENOUS ONCE
Status: COMPLETED | OUTPATIENT
Start: 2020-05-08 | End: 2020-05-08

## 2020-05-08 RX ORDER — IBUPROFEN 200 MG
400 TABLET ORAL EVERY 6 HOURS PRN
COMMUNITY
End: 2022-09-21 | Stop reason: ALTCHOICE

## 2020-05-08 RX ORDER — MORPHINE SULFATE 4 MG/ML
4 INJECTION, SOLUTION INTRAMUSCULAR; INTRAVENOUS ONCE
Status: COMPLETED | OUTPATIENT
Start: 2020-05-08 | End: 2020-05-08

## 2020-05-08 RX ORDER — ATORVASTATIN CALCIUM 10 MG/1
10 TABLET, FILM COATED ORAL NIGHTLY
COMMUNITY

## 2020-05-08 RX ORDER — DOCUSATE SODIUM 100 MG/1
100 CAPSULE, LIQUID FILLED ORAL 2 TIMES DAILY
Qty: 28 CAPSULE | Refills: 0 | Status: SHIPPED | OUTPATIENT
Start: 2020-05-08 | End: 2020-05-22

## 2020-05-08 RX ORDER — KETOROLAC TROMETHAMINE 15 MG/ML
15 INJECTION, SOLUTION INTRAMUSCULAR; INTRAVENOUS ONCE
Status: COMPLETED | OUTPATIENT
Start: 2020-05-08 | End: 2020-05-08

## 2020-05-08 RX ADMIN — MORPHINE SULFATE 4 MG: 4 INJECTION, SOLUTION INTRAMUSCULAR; INTRAVENOUS at 14:44

## 2020-05-08 RX ADMIN — KETOROLAC TROMETHAMINE 15 MG: 15 INJECTION, SOLUTION INTRAMUSCULAR; INTRAVENOUS at 16:59

## 2020-05-08 RX ADMIN — SODIUM CHLORIDE 500 ML: 9 INJECTION, SOLUTION INTRAVENOUS at 15:54

## 2020-05-08 ASSESSMENT — ENCOUNTER SYMPTOMS
SORE THROAT: 0
CONSTIPATION: 0
EYE PAIN: 0
ABDOMINAL PAIN: 1
VOMITING: 0
BACK PAIN: 0
COUGH: 0
SHORTNESS OF BREATH: 0
NAUSEA: 0
ABDOMINAL DISTENTION: 0
EYE DISCHARGE: 0
SINUS PRESSURE: 0
EYE REDNESS: 0
BLOOD IN STOOL: 0
DIARRHEA: 1
WHEEZING: 0

## 2020-05-08 ASSESSMENT — PAIN DESCRIPTION - DESCRIPTORS: DESCRIPTORS: SHARP

## 2020-05-08 ASSESSMENT — PAIN SCALES - GENERAL
PAINLEVEL_OUTOF10: 10

## 2020-05-08 ASSESSMENT — PAIN DESCRIPTION - PAIN TYPE: TYPE: ACUTE PAIN

## 2020-05-08 ASSESSMENT — PAIN DESCRIPTION - LOCATION: LOCATION: FLANK

## 2020-05-08 ASSESSMENT — PAIN DESCRIPTION - ORIENTATION: ORIENTATION: RIGHT

## 2020-05-08 NOTE — ED PROVIDER NOTES
Chief Complaint   Patient presents with    Flank Pain      - [address] Diarrhea       Pool Palacio is a 79-year-old female presents today with right flank and right upper quadrant abdominal pain. Patient states symptoms started on Monday, and have continued to persist throughout the week. He states she called her family doctor who recommended that she get imaging and come to the ED for further evaluation. Patient states she has not had symptoms like this before in the past.  She states the symptoms are not made better or worse by anything in specific. She describes as right upper quadrant and right flank pain which radiates in between the two. She has tried taking extra strength Tylenol with minimal relief of her symptoms. She denies urinary symptoms, vaginal bleeding or discharge. No history of kidney stones that she is aware of. Patient has multiple abdominal surgeries in the past.  She states she has been having loose bowel movements for the past several days. She denies nausea or vomiting, fevers or chills. The history is provided by the patient. No  was used. Review of Systems   Constitutional: Negative for chills and fever. HENT: Negative for ear pain, sinus pressure and sore throat. Eyes: Negative for pain, discharge and redness. Respiratory: Negative for cough, shortness of breath and wheezing. Cardiovascular: Negative for chest pain. Gastrointestinal: Positive for abdominal pain and diarrhea. Negative for abdominal distention, blood in stool, constipation, nausea and vomiting. Genitourinary: Positive for flank pain. Negative for difficulty urinating, dysuria, frequency and hematuria. Musculoskeletal: Negative for arthralgias and back pain. Skin: Negative for rash and wound. Neurological: Negative for weakness and headaches. Hematological: Negative for adenopathy.    Psychiatric/Behavioral: Negative for behavioral problems and Abnormal; Notable for the following components:    Bacteria, UA RARE (*)     All other components within normal limits   LACTIC ACID, PLASMA   LIPASE     CT ABDOMEN PELVIS WO CONTRAST Additional Contrast? None   Final Result   No definite acute abnormality on this unenhanced somewhat technically limited   scan; no hydroureteronephrosis. Several tiny especially right-sided   nonobstructing stones questioned, as noted. Normal appendix. Small umbilical hernia. Moderate retained stool, more on the right with some fecalization of small   bowel. Residual food/debris and fluid in the stomach. Additional findings, as above. PROCEDURE NOTE   5/8/20   Time: 7433  RIGHT UPPER QUADRANT BEDSIDE ULTRASOUND   Risks, benefits and alternatives (for applicable procedures below) described. Performed By: Brett Marques DO. Indication:  Pain. Informed consent: The patient was counseled regarding the procedure, it's indications, risks, potential complications and alternatives and any questions were answered. Consent was obtained. .  Procedural Quadrants:     RUQ/GALLBLADDER:  Normal. No evidence of stones or gallbladder wall thickening   RENALS:  Hydronephrosis absent. MDM  Number of Diagnoses or Management Options  Constipation, unspecified constipation type:   Flank pain:   Hyperglycemia:   Diagnosis management comments: Patient is a 35year old female who presented for RUQ and right flank pain. Bedside ultrasound was performed by myself and the attending, with no evidence of gallstones. Due to right flank pain CT without was ordered to evaluate for stones. Urine shows trace blood. Labs show elevated elevated glucose, with no anion gap, and a normal bicarb. CT of the abdomen several tiny right-sided nonobstructing stones, and moderate retained stool consistent with constipation. Patient was given multiple medications in the department with improvement of her symptoms.   UA shows no daily for 14 days, Disp-28 capsule, R-0Print             Diagnosis:  1. Flank pain    2. Hyperglycemia    3. Constipation, unspecified constipation type        Disposition:  Patient's disposition: Discharge to home  Patient's condition is stable.          Dorie Cosme,   Resident  05/09/20 6611

## 2022-04-17 ENCOUNTER — HOSPITAL ENCOUNTER (EMERGENCY)
Age: 42
Discharge: HOME | End: 2022-04-17
Attending: EMERGENCY MEDICINE
Payer: MEDICARE

## 2022-04-17 VITALS
BODY MASS INDEX: 23.62 KG/M2 | TEMPERATURE: 97.5 F | SYSTOLIC BLOOD PRESSURE: 132 MMHG | WEIGHT: 105 LBS | RESPIRATION RATE: 20 BRPM | HEIGHT: 56 IN | DIASTOLIC BLOOD PRESSURE: 76 MMHG | OXYGEN SATURATION: 100 % | HEART RATE: 139 BPM

## 2022-04-17 DIAGNOSIS — L50.9 URTICARIA: Primary | ICD-10-CM

## 2022-04-17 PROCEDURE — 6370000000 HC RX 637 (ALT 250 FOR IP): Performed by: EMERGENCY MEDICINE

## 2022-04-17 PROCEDURE — 99283 EMERGENCY DEPT VISIT LOW MDM: CPT

## 2022-04-17 RX ORDER — PREDNISONE 20 MG/1
60 TABLET ORAL ONCE
Status: COMPLETED | OUTPATIENT
Start: 2022-04-17 | End: 2022-04-17

## 2022-04-17 RX ORDER — DIPHENHYDRAMINE HCL 25 MG
25 TABLET ORAL EVERY 6 HOURS PRN
Status: DISCONTINUED | OUTPATIENT
Start: 2022-04-17 | End: 2022-04-18 | Stop reason: HOSPADM

## 2022-04-17 RX ORDER — PREDNISONE 10 MG/1
40 TABLET ORAL DAILY
Qty: 30 TABLET | Refills: 0 | Status: SHIPPED | OUTPATIENT
Start: 2022-04-17 | End: 2022-04-22

## 2022-04-17 RX ORDER — HYDROXYZINE PAMOATE 25 MG/1
25 CAPSULE ORAL 3 TIMES DAILY PRN
Qty: 20 CAPSULE | Refills: 0 | Status: SHIPPED | OUTPATIENT
Start: 2022-04-17 | End: 2022-05-01

## 2022-04-17 RX ADMIN — DIPHENHYDRAMINE HCL 25 MG: 25 TABLET ORAL at 23:25

## 2022-04-17 RX ADMIN — PREDNISONE 60 MG: 20 TABLET ORAL at 23:25

## 2022-04-17 ASSESSMENT — ENCOUNTER SYMPTOMS
CHEST TIGHTNESS: 0
STRIDOR: 0
WHEEZING: 0
SORE THROAT: 0
VOICE CHANGE: 0
ABDOMINAL PAIN: 0
NAUSEA: 0
FACIAL SWELLING: 0
SHORTNESS OF BREATH: 0
TROUBLE SWALLOWING: 0

## 2022-04-18 NOTE — ED PROVIDER NOTES
Patient presents to the ED for evaluation of body wide itching. Started around 5:00 yesterday morning. Patient states that she slept on the couch of someone she knows and believes that she had a reaction to something was on her couch. She denies having any new soaps, detergents, or products. No new clothes. She denies the rash being painful. It is itchy all over. She states she has itching in her arms and legs as well as her back and stomach. Denies any difficulty breathing or swallowing. Denies wheezing. Denies shortness of breath. States she has no fever or chills. Has not tried anything other than hydrate her cortisone cream and Benadryl cream which did not help. Limbs are moderate severity have been persistent           Review of Systems   Constitutional: Negative for chills and fever. HENT: Negative for facial swelling, sore throat, trouble swallowing and voice change. Respiratory: Negative for chest tightness, shortness of breath, wheezing and stridor. Gastrointestinal: Negative for abdominal pain and nausea. Musculoskeletal: Negative for arthralgias, joint swelling and myalgias. Skin: Positive for rash. Negative for wound. Neurological: Negative for dizziness and light-headedness. Physical Exam  Vitals and nursing note reviewed. Constitutional:       General: She is not in acute distress. Appearance: She is well-developed. She is not ill-appearing or diaphoretic. HENT:      Head: Normocephalic and atraumatic. Mouth/Throat:      Pharynx: No posterior oropharyngeal erythema. Eyes:      Conjunctiva/sclera: Conjunctivae normal.   Cardiovascular:      Rate and Rhythm: Normal rate and regular rhythm. Heart sounds: Normal heart sounds. No murmur heard. Pulmonary:      Effort: Pulmonary effort is normal. No respiratory distress. Breath sounds: Normal breath sounds. No wheezing or rales.    Abdominal:      General: Bowel sounds are normal. Saturation Interpretation: Normal      ------------------------------------------ PROGRESS NOTES ------------------------------------------  I have spoken with the patient and discussed todays results, in addition to providing specific details for the plan of care and counseling regarding the diagnosis and prognosis. Their questions are answered at this time and they are agreeable with the plan. I discussed at length with them reasons for immediate return here for re evaluation. They will followup with primary care by calling their office tomorrow. --------------------------------- ADDITIONAL PROVIDER NOTES ---------------------------------  At this time the patient is without objective evidence of an acute process requiring hospitalization or inpatient management. They have remained hemodynamically stable throughout their entire ED visit and are stable for discharge with outpatient follow-up. The plan has been discussed in detail and they are aware of the specific conditions for emergent return, as well as the importance of follow-up. New Prescriptions    HYDROXYZINE (VISTARIL) 25 MG CAPSULE    Take 1 capsule by mouth 3 times daily as needed for Itching    PREDNISONE (DELTASONE) 10 MG TABLET    Take 4 tablets by mouth daily for 5 days       Diagnosis:  1. Urticaria        Disposition:  Patient's disposition: Discharge to home  Patient's condition is stable.                   Mega Fall DO  04/17/22 1705

## 2022-04-18 NOTE — ED NOTES
Pt discharged to home at this time. Pt is A&OX , has all belongings. Discharge instructions and medications have been reviewed. Pt has verbalized an understanding of all instructions relayed by RN. Pt ambulated out of ED w/steady and stable gait.       Meryle Raddle, RN  04/17/22 1015

## 2022-09-21 ENCOUNTER — HOSPITAL ENCOUNTER (EMERGENCY)
Age: 42
Discharge: HOME | End: 2022-09-21
Payer: COMMERCIAL

## 2022-09-21 VITALS
WEIGHT: 120 LBS | HEART RATE: 95 BPM | BODY MASS INDEX: 26.9 KG/M2 | SYSTOLIC BLOOD PRESSURE: 116 MMHG | RESPIRATION RATE: 18 BRPM | OXYGEN SATURATION: 99 % | DIASTOLIC BLOOD PRESSURE: 71 MMHG | TEMPERATURE: 97.8 F

## 2022-09-21 DIAGNOSIS — J06.9 UPPER RESPIRATORY TRACT INFECTION, UNSPECIFIED TYPE: Primary | ICD-10-CM

## 2022-09-21 LAB — SARS-COV-2, NAAT: NOT DETECTED

## 2022-09-21 PROCEDURE — 87635 SARS-COV-2 COVID-19 AMP PRB: CPT

## 2022-09-21 PROCEDURE — 99283 EMERGENCY DEPT VISIT LOW MDM: CPT

## 2022-09-21 RX ORDER — DOXYCYCLINE HYCLATE 100 MG/1
100 CAPSULE ORAL 2 TIMES DAILY
COMMUNITY

## 2022-09-21 RX ORDER — FLUCONAZOLE 150 MG/1
150 TABLET ORAL ONCE
COMMUNITY

## 2022-09-21 RX ORDER — BROMPHENIRAMINE MALEATE, PSEUDOEPHEDRINE HYDROCHLORIDE, AND DEXTROMETHORPHAN HYDROBROMIDE 2; 30; 10 MG/5ML; MG/5ML; MG/5ML
5 SYRUP ORAL 4 TIMES DAILY PRN
Qty: 120 ML | Refills: 0 | Status: SHIPPED | OUTPATIENT
Start: 2022-09-21 | End: 2022-09-26

## 2022-09-21 RX ORDER — IBUPROFEN 600 MG/1
600 TABLET ORAL 3 TIMES DAILY PRN
Qty: 30 TABLET | Refills: 0 | Status: SHIPPED | OUTPATIENT
Start: 2022-09-21

## 2022-09-21 NOTE — Clinical Note
Sal Akhtar was seen and treated in our emergency department on 9/21/2022. She may return to work on 09/24/2022. If you have any questions or concerns, please don't hesitate to call.       Regina Allen PA-C

## 2022-09-21 NOTE — Clinical Note
Ren Masterson was seen and treated in our emergency department on 9/21/2022. She may return to work on 09/24/2022. If you have any questions or concerns, please don't hesitate to call.       Mark Whiteside PA-C

## 2022-09-22 NOTE — ED PROVIDER NOTES
[facility]                                                                                                                                    Department of Emergency Medicine   ED  Provider Note  Admit Date/RoomTime: 9/21/2022 10:01 PM  ED Room: 25/25        HPI:  9/21/22,   Time: 10:19 PM EDT         Yuki Medellin is a 42 y.o. female presenting to the ED for cough, congestion and concern for Covid 19, beginning 2 days ago. The complaint has been persistent, moderate in severity, and worsened by nothing. 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 She was exposed to COVID-19 and has just started with some upper respiratory symptoms. She states that she had COVID in 2021 and was pretty sick and admitted to the hospital in spite of the fact that she been vaccinated. She is a smoker. Reports cough, congestion, sore throat, headache, body aches, diffuse upper chest pain. Denies shortness of breath. States that she was not able to go to work today. Wants tested at this time        ROS:     Constitutional: Negative for fever and chills  HENT: Negative for ear pain, sore throat and sinus pressure  Eyes: Negative for pain, discharge and redness  Respiratory:  See HPI  Cardiovascular:  See HPI  Gastrointestinal: Negative for nausea, vomiting, diarrhea and abdominal distention  Genitourinary: Negative for dysuria and frequency  Musculoskeletal: Negative for back pain and arthralgia  Skin: Negative for rash and wound  Neurological: Negative for weakness and headaches  Hematological: Negative for adenopathy    All other systems reviewed and are negative      -------------------------------- PAST HISTORY ----------------------------------  Past Medical History:  has a past medical history of Birth defect, Diabetes mellitus ([facility] [address] Ectopic pregnancy, and Hip dysplasia. Past Surgical History:  has a past surgical history that includes Hysterectomy and hip surgery.     Social History:  reports that she has been smoking cigars. She has been smoking an average of .25 packs per day. She has never used smokeless tobacco. She reports current alcohol use. She reports that she does not use drugs. Family History: family history is not on file. The patients home medications have been reviewed. Allergies: Patient has no known allergies. --------------------------------- RESULTS ------------------------------------------  All laboratory and radiology results have been personally reviewed by myself   LABS:  Results for orders placed or performed during the hospital encounter of 09/21/22   COVID-19, Rapid    Specimen: Nasopharyngeal Swab   Result Value Ref Range    SARS-CoV-2, NAAT Not Detected Not Detected       RADIOLOGY:  Interpreted by Radiologist.  No orders to display       ----------------- NURSING NOTES AND VITALS REVIEWED ---------------   The nursing notes within the ED encounter and vital signs as below have been reviewed. /71   Pulse 95   Temp 97.8 °F (36.6 °C)   Resp 18   Wt 120 lb (54.4 kg)   SpO2 99%   BMI 26.90 kg/m²   Oxygen Saturation Interpretation: Normal      --------------------------------PHYSICAL EXAM------------------------------------      Constitutional/General: Alert and oriented x3, well appearing, non toxic in NAD  Head: NC/AT  Eyes: PERRL, EOMI  TM's intact and clear. Posterior pharynx unremarkable. Mouth: Oropharynx clear, handling secretions, no trismus  Neck: Supple, full ROM, no meningeal signs  Pulmonary: Lungs clear to auscultation bilaterally, no wheezes, rales, or rhonchi. Not in respiratory distress  Cardiovascular:  Regular rate and rhythm, no murmurs, gallops, or rubs. 2+ distal pulses  Extremities: Moves all extremities x 4. Warm and well perfused  Skin: warm and dry without rash  Neurologic: GCS 15,  Intact.   No focal deficits  Psych: Normal Affect      ------------------------ ED COURSE/MEDICAL DECISION MAKING----------------------  Medications - No data to display      Medical Decision Making:    Covid testing negative. URI, likely viral.   Benign and nontoxic exam.   Plan discharge home with Bromfed DM and Motrin. She can have slip for work if needed as well. F/U with PCP for persistent or ongoing complaints. Pt aware and agreeable to plan. Counseling: The emergency provider has spoken with the patient and discussed todays results, in addition to providing specific details for the plan of care and counseling regarding the diagnosis and prognosis. Questions are answered at this time and they are agreeable with the plan.      ------------------------ IMPRESSION AND DISPOSITION -------------------------------    IMPRESSION  1.  Upper respiratory tract infection, unspecified type        DISPOSITION  Disposition: Discharge to home  Patient condition is stable                   Tej Hanson PA-C  09/21/22 6121

## 2023-02-05 ENCOUNTER — APPOINTMENT (OUTPATIENT)
Dept: GENERAL RADIOLOGY | Age: 43
End: 2023-02-05
Payer: MEDICAID

## 2023-02-05 ENCOUNTER — APPOINTMENT (OUTPATIENT)
Dept: CT IMAGING | Age: 43
End: 2023-02-05
Payer: MEDICAID

## 2023-02-05 ENCOUNTER — HOSPITAL ENCOUNTER (EMERGENCY)
Age: 43
Discharge: HOME | End: 2023-02-05
Payer: MEDICAID

## 2023-02-05 VITALS
HEIGHT: 56 IN | SYSTOLIC BLOOD PRESSURE: 110 MMHG | OXYGEN SATURATION: 98 % | WEIGHT: 115 LBS | BODY MASS INDEX: 25.87 KG/M2 | RESPIRATION RATE: 18 BRPM | HEART RATE: 98 BPM | DIASTOLIC BLOOD PRESSURE: 76 MMHG | TEMPERATURE: 97.1 F

## 2023-02-05 DIAGNOSIS — S00.83XA CONTUSION OF FACE, INITIAL ENCOUNTER: ICD-10-CM

## 2023-02-05 DIAGNOSIS — S09.90XA CLOSED HEAD INJURY, INITIAL ENCOUNTER: ICD-10-CM

## 2023-02-05 DIAGNOSIS — M25.552 HIP PAIN, BILATERAL: ICD-10-CM

## 2023-02-05 DIAGNOSIS — M25.572 ARTHRALGIA OF LEFT ANKLE: ICD-10-CM
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
## 2023-02-05 DIAGNOSIS — M25.551 HIP PAIN, BILATERAL: ICD-10-CM

## 2023-02-05 DIAGNOSIS — S09.93XA INJURY OF MOUTH, INITIAL ENCOUNTER: ICD-10-CM

## 2023-02-05 DIAGNOSIS — M79.605 PAIN OF LEFT LOWER EXTREMITY: ICD-10-CM

## 2023-02-05 DIAGNOSIS — S06.0X0A CONCUSSION WITHOUT LOSS OF CONSCIOUSNESS, INITIAL ENCOUNTER: ICD-10-CM

## 2023-02-05 PROCEDURE — 6370000000 HC RX 637 (ALT 250 FOR IP): Performed by: NURSE PRACTITIONER

## 2023-02-05 PROCEDURE — 72131 CT LUMBAR SPINE W/O DYE: CPT

## 2023-02-05 PROCEDURE — 70450 CT HEAD/BRAIN W/O DYE: CPT

## 2023-02-05 PROCEDURE — 73610 X-RAY EXAM OF ANKLE: CPT

## 2023-02-05 PROCEDURE — 70486 CT MAXILLOFACIAL W/O DYE: CPT

## 2023-02-05 PROCEDURE — 71045 X-RAY EXAM CHEST 1 VIEW: CPT

## 2023-02-05 PROCEDURE — 72125 CT NECK SPINE W/O DYE: CPT

## 2023-02-05 PROCEDURE — 99284 EMERGENCY DEPT VISIT MOD MDM: CPT

## 2023-02-05 PROCEDURE — 73521 X-RAY EXAM HIPS BI 2 VIEWS: CPT

## 2023-02-05 PROCEDURE — 73590 X-RAY EXAM OF LOWER LEG: CPT

## 2023-02-05 PROCEDURE — 73552 X-RAY EXAM OF FEMUR 2/>: CPT

## 2023-02-05 RX ORDER — NAPROXEN 500 MG/1
500 TABLET ORAL 2 TIMES DAILY PRN
Qty: 28 TABLET | Refills: 0 | Status: SHIPPED | OUTPATIENT
Start: 2023-02-05

## 2023-02-05 RX ORDER — HYDROCODONE BITARTRATE AND ACETAMINOPHEN 5; 325 MG/1; MG/1
1 TABLET ORAL ONCE
Status: COMPLETED | OUTPATIENT
Start: 2023-02-05 | End: 2023-02-05

## 2023-02-05 RX ORDER — TIZANIDINE 2 MG/1
2 TABLET ORAL EVERY 8 HOURS PRN
Qty: 10 TABLET | Refills: 0 | Status: SHIPPED | OUTPATIENT
Start: 2023-02-05

## 2023-02-05 RX ADMIN — HYDROCODONE BITARTRATE AND ACETAMINOPHEN 1 TABLET: 5; 325 TABLET ORAL at 02:38

## 2023-02-05 ASSESSMENT — PAIN DESCRIPTION - LOCATION: LOCATION: HEAD

## 2023-02-05 ASSESSMENT — PAIN DESCRIPTION - DESCRIPTORS: DESCRIPTORS: ACHING

## 2023-02-05 ASSESSMENT — LIFESTYLE VARIABLES
HOW MANY STANDARD DRINKS CONTAINING ALCOHOL DO YOU HAVE ON A TYPICAL DAY: 3 OR 4
HOW OFTEN DO YOU HAVE A DRINK CONTAINING ALCOHOL: MONTHLY OR LESS

## 2023-02-05 ASSESSMENT — PAIN SCALES - GENERAL: PAINLEVEL_OUTOF10: 10
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
## 2023-02-05 NOTE — Clinical Note
Sloan Landon was seen and treated in our emergency department on 2/5/2023. She may return to work on 02/09/2023. If you have any questions or concerns, please don't hesitate to call.       Sin Pope, APRN - CNP

## 2023-02-05 NOTE — ED NOTES
Radiology Procedure Waiver   Name: Yamila Simental  : 1980  MRN: [id number]    Date:  23    Time: 1:47 AM EST    Benefits of immediately proceeding with Radiology exam(s) without pre-testing outweigh the risks or are not indicated as specified below and therefore the following is/are being waived:    [x] Pregnancy test   [] Patients LMP on-time and regular.   [] Patient had Tubal Ligation or has other Contraception Device. [] Patient  is Menopausal or Premenarcheal.    [x] Patient had Full or Partial Hysterectomy. [] Protocol for Iodine allergy    [] MRI Questionnaire     [] BUN/Creatinine   [] Patient age w/no hx of renal dysfunction. [] Patient on Dialysis. [] Recent Normal Labs.   Electronically signed by TAY Cline CNP on 23 at 1:47 AM EST              TAY Cline CNP  23 0147
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
## 2023-06-21 ENCOUNTER — HOSPITAL ENCOUNTER (EMERGENCY)
Age: 43
Discharge: HOME | End: 2023-06-21
Attending: STUDENT IN AN ORGANIZED HEALTH CARE EDUCATION/TRAINING PROGRAM
Payer: COMMERCIAL

## 2023-06-21 ENCOUNTER — HOSPITAL ENCOUNTER (EMERGENCY)
Age: 43
Discharge: HOME | End: 2023-06-21
Attending: EMERGENCY MEDICINE
Payer: COMMERCIAL

## 2023-06-21 VITALS
DIASTOLIC BLOOD PRESSURE: 85 MMHG | SYSTOLIC BLOOD PRESSURE: 117 MMHG | HEART RATE: 92 BPM | BODY MASS INDEX: 24.66 KG/M2 | RESPIRATION RATE: 18 BRPM | TEMPERATURE: 98.4 F | OXYGEN SATURATION: 99 % | WEIGHT: 110 LBS

## 2023-06-21 VITALS
OXYGEN SATURATION: 99 % | RESPIRATION RATE: 16 BRPM | HEART RATE: 94 BPM | SYSTOLIC BLOOD PRESSURE: 129 MMHG | TEMPERATURE: 97.9 F | DIASTOLIC BLOOD PRESSURE: 107 MMHG

## 2023-06-21 DIAGNOSIS — H57.12 PAIN AROUND LEFT EYE: Primary | ICD-10-CM

## 2023-06-21 DIAGNOSIS — S05.02XA ABRASION OF LEFT CORNEA, INITIAL ENCOUNTER: Primary | ICD-10-CM

## 2023-06-21 PROCEDURE — 99283 EMERGENCY DEPT VISIT LOW MDM: CPT

## 2023-06-21 PROCEDURE — 6370000000 HC RX 637 (ALT 250 FOR IP)

## 2023-06-21 PROCEDURE — 6370000000 HC RX 637 (ALT 250 FOR IP): Performed by: STUDENT IN AN ORGANIZED HEALTH CARE EDUCATION/TRAINING PROGRAM

## 2023-06-21 PROCEDURE — 6370000000 HC RX 637 (ALT 250 FOR IP): Performed by: NURSE PRACTITIONER

## 2023-06-21 RX ORDER — GABAPENTIN 300 MG/1
300 CAPSULE ORAL 3 TIMES DAILY
COMMUNITY

## 2023-06-21 RX ORDER — IBUPROFEN 400 MG/1
400 TABLET ORAL ONCE
Status: COMPLETED | OUTPATIENT
Start: 2023-06-21 | End: 2023-06-21

## 2023-06-21 RX ORDER — ERYTHROMYCIN 5 MG/G
OINTMENT OPHTHALMIC
Qty: 1 EACH | Refills: 0 | Status: SHIPPED | OUTPATIENT
Start: 2023-06-21 | End: 2023-07-01

## 2023-06-21 RX ORDER — POLYVINYL ALCOHOL 14 MG/ML
1 SOLUTION/ DROPS OPHTHALMIC ONCE
Status: COMPLETED | OUTPATIENT
Start: 2023-06-21 | End: 2023-06-21

## 2023-06-21 RX ORDER — TETRACAINE HYDROCHLORIDE 5 MG/ML
1 SOLUTION OPHTHALMIC ONCE
Status: COMPLETED | OUTPATIENT
Start: 2023-06-21 | End: 2023-06-21

## 2023-06-21 RX ADMIN — FLUORESCEIN SODIUM 1 STRIP: 1 STRIP OPHTHALMIC at 03:39

## 2023-06-21 RX ADMIN — POLYVINYL ALCOHOL 1 DROP: 14 SOLUTION/ DROPS OPHTHALMIC at 03:38

## 2023-06-21 RX ADMIN — TETRACAINE HYDROCHLORIDE 1 DROP: 5 SOLUTION OPHTHALMIC at 03:38

## 2023-06-21 RX ADMIN — FLUORESCEIN SODIUM 1 MG: 1 STRIP OPHTHALMIC at 14:03

## 2023-06-21 RX ADMIN — IBUPROFEN 400 MG: 400 TABLET, FILM COATED ORAL at 14:03

## 2023-06-21 RX ADMIN — TETRACAINE HYDROCHLORIDE 1 DROP: 5 SOLUTION OPHTHALMIC at 13:39

## 2023-06-21 ASSESSMENT — PAIN SCALES - GENERAL
PAINLEVEL_OUTOF10: 10
PAINLEVEL_OUTOF10: 6

## 2023-06-21 ASSESSMENT — LIFESTYLE VARIABLES
HOW OFTEN DO YOU HAVE A DRINK CONTAINING ALCOHOL: NEVER
HOW MANY STANDARD DRINKS CONTAINING ALCOHOL DO YOU HAVE ON A TYPICAL DAY: PATIENT DOES NOT DRINK
HOW OFTEN DO YOU HAVE A DRINK CONTAINING ALCOHOL: 2-4 TIMES A MONTH

## 2023-06-21 ASSESSMENT — PAIN DESCRIPTION - ONSET: ONSET: ON-GOING

## 2023-06-21 ASSESSMENT — PAIN DESCRIPTION - PAIN TYPE: TYPE: ACUTE PAIN

## 2023-06-21 ASSESSMENT — PAIN DESCRIPTION - FREQUENCY: FREQUENCY: CONTINUOUS

## 2023-06-21 ASSESSMENT — PAIN DESCRIPTION - DESCRIPTORS: DESCRIPTORS: BURNING

## 2023-06-21 ASSESSMENT — PAIN - FUNCTIONAL ASSESSMENT: PAIN_FUNCTIONAL_ASSESSMENT: 0-10

## 2023-06-21 ASSESSMENT — PAIN DESCRIPTION - LOCATION: LOCATION: EYE

## 2023-06-21 NOTE — ED PROVIDER NOTES
Name: Brian Sepulveda    MRN: [id number]     Date / Time Roomed:  6/21/2023 12:27 PM  ED Bed Assignment:  TZ1/MJ4    ------------------ History of Present Illness --------------------  6/21/23, Time: 1:32 PM EDT   Chief Complaint   Patient presents with    Eye Pain     Just seen here, Left eye burning and hurting, given artificial tears, eye drops burning, \"hard to open eye, light burning eye, \"it got worse so I came back. \"      HPI    Brian Sepulveda is a 36 y.o. female, with hx of diabetes, previous ectopic pregnancy,, who presents to the ED today for left eye burning sensation as well as pain of the left cheek under the eye. Patient relates her pain started yesterday while in class and her eye immediately began to water and feeling of burning sensation. Pt came to ED last night and eye was evaluated and pt was discharged home with prescription for artificial tears and ophtho follow up with concern for eye irritation. Pt states her pain has continued throughout the night. Denies any foreign body sensation. Does not wear contacts. Pt denies any fever, HA, n/v, ear pain, vision changes, cp, sob, weakness, GI or  complaints. The pt denies other ROS at this time. PCP: No primary care provider on file. Jackelin Hyltono -------------------- PMH --------------------    Past Medical History:   has a past medical history of Birth defect, Diabetes mellitus ([facility] [address] Ectopic pregnancy, and Hip dysplasia. Surgical History:  Past Surgical History:   Procedure Laterality Date    HIP SURGERY      HYSTERECTOMY (CERVIX STATUS UNKNOWN)         Social History:  Social History     Tobacco Use    Smoking status: Every Day     Packs/day: 0.25     Types: Cigars, Cigarettes    Smokeless tobacco: Never   Substance Use Topics    Alcohol use: Yes     Comment: occasionally    Drug use: Yes     Types: Marijuana Sasha Katiuska)       Family History:  No family history on file. Allergies:  Patient has no known allergies.     The patients past medical

## 2023-06-21 NOTE — DISCHARGE INSTRUCTIONS
Please follow-up with ophthalmology as well as your primary care doctor. Please return ED for new or worsening symptoms. Please apply erythromycin eye ointment 4-6 times daily to the left eye. Please do not scratch at the eye. Tylenol or Motrin for any discomfort.

## 2023-06-21 NOTE — ED PROVIDER NOTES
(NEURONTIN) 300 MG CAPSULE    Take 1 capsule by mouth 3 times daily. INSULIN GLARGINE (LANTUS) 100 UNIT/ML INJECTION VIAL    Inject 50 Units into the skin nightly    TIZANIDINE (ZANAFLEX) 2 MG TABLET    Take 1 tablet by mouth every 8 hours as needed (Muscle spasms)    VITAMIN D (CHOLECALCIFEROL) 1000 UNIT TABS TABLET    Take 1,000 Units by mouth daily       SCREENINGS     Blakely Coma Scale  Eye Opening: Spontaneous  Best Verbal Response: Oriented  Best Motor Response: Obeys commands  Blakely Coma Scale Score: 15         CIWA Assessment  BP: (!) 129/107  Pulse: 94       PHYSICAL EXAM   Oxygen Saturation Interpretation: Normal on room air analysis. ED Triage Vitals   BP Temp Temp Source Pulse Respirations SpO2 Height Weight   06/21/23 0243 06/21/23 0229 06/21/23 0229 06/21/23 0229 06/21/23 0229 06/21/23 0229 -- --   (!) 129/107 97.9 °F (36.6 °C) Infrared 94 16 99 %           Physical Exam  General: Awake alert and oriented. Well-appearing. Nontoxic. HEENT: Normocephalic, atraumatic. Exam completed after tetracaine instilled and pain relieved pupils equal round and reactive, extraocular motions are intact no evidence of entrapment. There is no evidence of hyphema. Eyelids are inverted demonstrating no foreign body. There is no fluorescein uptake noted. No corneal foreign body appreciated. Conjunctiva is diffusely injected. Her visual acuity is 20/40 OD, 20/40 OS, 20/30 OU  Neck: Normal range of motion  Cardiac: Regular rate  Respiratory: Respirations even, unlabored. No respiratory distress  Abdomen: Nondistended  Musculoskelatal: Moves all extremities x4  Neuro: Nonlateralizing  Skin: Flesh tone, warm, dry  Psych: Normal affect  DIAGNOSTIC RESULTS   (All laboratory and radiology results have been personally reviewed by myself)  Labs:  No results found for this visit on 06/21/23. Imaging: All Radiology results interpreted by Radiologist unless otherwise noted.   No orders to display       ED

## 2023-06-22 ASSESSMENT — VISUAL ACUITY: OU: 1

## 2023-07-26 ENCOUNTER — HOSPITAL ENCOUNTER (OUTPATIENT)
Dept: PHYSICAL THERAPY | Age: 43
Setting detail: THERAPIES SERIES
Discharge: HOME | End: 2023-07-26
Payer: COMMERCIAL

## 2023-07-26 PROCEDURE — 97161 PT EVAL LOW COMPLEX 20 MIN: CPT | Performed by: PHYSICAL THERAPIST

## 2023-07-26 ASSESSMENT — PAIN DESCRIPTION - LOCATION: LOCATION: HIP

## 2023-07-26 ASSESSMENT — PAIN DESCRIPTION - ORIENTATION: ORIENTATION: RIGHT;LEFT

## 2023-07-26 ASSESSMENT — PAIN SCALES - GENERAL: PAINLEVEL_OUTOF10: 0

## 2023-07-26 ASSESSMENT — PAIN DESCRIPTION - DESCRIPTORS: DESCRIPTORS: ACHING

## 2023-07-26 NOTE — PROGRESS NOTES
Physical Therapy    Physical Therapy: Initial Evaluation    Patient: Kamille Ayon (52 y.o. female)   Examination Date:   Plan of Care Certification Period: 2023 to        :  1980 ;    Confirmed: Yes MRN: [id number]  CSN: [id number]   Insurance: Payor: [address] [place] [address] / [facility] / Product Type: *No Product type* /   Insurance ID: [id number] - (Medicare Managed) Secondary Insurance (if applicable):    Referring Physician: Ariane Damon DO     PCP: No primary care provider on file. Visits to Date/Visits Approved: 1 /      No Show/Cancelled Appts:   /       Medical Diagnosis: No admission diagnoses are documented for this encounter. pain of L hip, pain of R hip  Treatment Diagnosis:       PERTINENT MEDICAL HISTORY           Medical History: Chart Reviewed: Yes   Past Medical History:   Diagnosis Date    Birth defect     Diabetes mellitus ([address])     Ectopic pregnancy     Hip dysplasia      Surgical History:   Past Surgical History:   Procedure Laterality Date    HIP SURGERY      HYSTERECTOMY (CERVIX STATUS UNKNOWN)         Medications:   Current Outpatient Medications:     gabapentin (NEURONTIN) 300 MG capsule, Take 1 capsule by mouth 3 times daily. , Disp: , Rfl:     tiZANidine (ZANAFLEX) 2 MG tablet, Take 1 tablet by mouth every 8 hours as needed (Muscle spasms), Disp: 10 tablet, Rfl: 0    doxycycline hyclate (VIBRAMYCIN) 100 MG capsule, Take 1 capsule by mouth 2 times daily, Disp: , Rfl:     fluconazole (DIFLUCAN) 150 MG tablet, Take 1 tablet by mouth once, Disp: , Rfl:     atorvastatin (LIPITOR) 10 MG tablet, Take 1 tablet by mouth nightly, Disp: , Rfl:     insulin glargine (LANTUS) 100 UNIT/ML injection vial, Inject 50 Units into the skin nightly, Disp: , Rfl:     vitamin D (CHOLECALCIFEROL) 1000 UNIT TABS tablet, Take 1,000 Units by mouth daily, Disp: , Rfl:     acetaminophen (TYLENOL) 500 MG tablet, Take 1 tablet by mouth every 6 hours as

## 2023-08-17 ENCOUNTER — HOSPITAL ENCOUNTER (OUTPATIENT)
Dept: PHYSICAL THERAPY | Age: 43
Setting detail: THERAPIES SERIES
Discharge: HOME | End: 2023-08-17

## 2023-08-24 ENCOUNTER — APPOINTMENT (OUTPATIENT)
Dept: PHYSICAL THERAPY | Age: 43
End: 2023-08-24
Payer: COMMERCIAL

## 2023-09-05 ENCOUNTER — HOSPITAL ENCOUNTER (OUTPATIENT)
Dept: PHYSICAL THERAPY | Age: 43
Setting detail: THERAPIES SERIES
End: 2023-09-05
Payer: COMMERCIAL

## 2023-09-07 ENCOUNTER — HOSPITAL ENCOUNTER (OUTPATIENT)
Dept: PHYSICAL THERAPY | Age: 43
Setting detail: THERAPIES SERIES
Discharge: HOME | End: 2023-09-07
Payer: COMMERCIAL

## 2023-09-07 PROCEDURE — 97113 AQUATIC THERAPY/EXERCISES: CPT

## 2023-09-07 NOTE — PROGRESS NOTES
Jonel Padron  Phone: [phone] Fax: [phone]        Physical Therapy Aquatic Flow Sheet       Date:  2023    Patient Name:  Oneil Keita    :  1980  Restrictions/Precautions:    Diagnosis:  pain of L hip, pain of R hip  Treatment Diagnosis:    Insurance/Certification information:  [address] [facility] [address] / Plan: [address] [place] [address] / Product Type: *No Product type* /   Insurance ID: [id number] - (Medicare Managed)  Referring Physician:  : Meagan Harry DO     PCP: No primary care provider on file. Plan of care signed (Y/N):    Visit# / total visits:  3/12  Pain level: 8/10   Electronically signed by:  James Melo PTA  Time In:1315  Time MXW:0698    Subjective: Patient presents to PT with reports of B hip pain due to PMHx of B hip dysplasia. Pain greater on R  compared to L hip. Patient reports having 6 past surgeries on R hip/leg region. She states she has been taking a medication at night which alleviates the symptoms allowing her to sleep throughout the night. Patient ambulates without AD. Pt denies any recent falls. Patient has had therapy in the past which has helped.       Key  B= Belt DB= Dumbells T= Theratube   H= Hydrotone N= Noodles W= Weights   P= Paddles S= Speedo equipment K= Kickboard     Exercises/Activities   Warm-up/Amb  Minutes  Exercises  Minutes    Slow forward   5  HR/TR  5    Slow sideways  5  Marches  5    Slow backwards  5  Mini-squats  5    Medium forward    4-way SLR  5    Medium sideways    Hip circles/fig 8  5    Small shuffle    Hamstring curls  5    Jog    Knee extension  5    Braiding    Pelvic tilts  5    Bicycling  5  Scap squeezes          Shoulder flex/ext      Functional    Shoulder abd/add      Step    Shoulder H. abd/add      Lifting    Shoulder IR/ER      Hand to opp knee    Rowing      Push down squat    Bilateral pull down      UE PNF    Push/pull      LE PNF    Push downs      Wall push ups    Arm

## 2023-09-12 ENCOUNTER — HOSPITAL ENCOUNTER (OUTPATIENT)
Dept: PHYSICAL THERAPY | Age: 43
Setting detail: THERAPIES SERIES
Discharge: HOME | End: 2023-09-12
Payer: COMMERCIAL

## 2023-09-12 PROCEDURE — 97113 AQUATIC THERAPY/EXERCISES: CPT

## 2023-09-13 NOTE — PROGRESS NOTES
Jonel Padron  Phone: [phone] Fax: [phone]        Physical Therapy Aquatic Flow Sheet     Date 2023  Date:  2023    Patient Name:  Kenny Mandel    :  1980  Restrictions/Precautions:    Diagnosis:  pain of L hip, pain of R hip  Treatment Diagnosis:    Insurance/Certification information:  [address] [facility] / Plan: [address] [place] [address] / Product Type: *No Product type* /   Insurance ID: [id number] - (Medicare Managed)  Referring Physician:  : Lori Mckeon DO     PCP: No primary care provider on file. Plan of care signed (Y/N):    Visit# / total visits:    Pain level: 8/10   Electronically signed by:  Liss Barroso PTA  Time In:1315  Time EQX:9413    Subjective: Patient presents to PT with reports of B hip pain due to PMHx of B hip dysplasia. Pain greater on R  compared to L hip. Patient reports having 6 past surgeries on R hip/leg region. She states she has been taking a medication at night which alleviates the symptoms allowing her to sleep throughout the night. Patient ambulates without AD. Pt denies any recent falls. Patient has had therapy in the past which has helped.       Key  B= Belt DB= Dumbells T= Theratube   H= Hydrotone N= Noodles W= Weights   P= Paddles S= Speedo equipment K= Kickboard     Exercises/Activities   Warm-up/Amb  Minutes  Exercises  Minutes    Slow forward   5  HR/TR  5    Slow sideways  5  Marches  5    Slow backwards  5  Mini-squats  5    Medium forward    4-way SLR  5    Medium sideways    Hip circles/fig 8  5    Small shuffle    Hamstring curls  5    Jog    Knee extension  5    Braiding    Pelvic tilts  5    Bicycling  5  Scap squeezes          Shoulder flex/ext      Functional    Shoulder abd/add      Step    Shoulder H. abd/add      Lifting    Shoulder IR/ER      Hand to opp knee    Rowing      Push down squat    Bilateral pull down      UE PNF    Push/pull      LE PNF    Push downs      Wall

## 2023-09-14 ENCOUNTER — APPOINTMENT (OUTPATIENT)
Dept: PHYSICAL THERAPY | Age: 43
End: 2023-09-14
Payer: COMMERCIAL

## 2023-09-19 ENCOUNTER — HOSPITAL ENCOUNTER (OUTPATIENT)
Dept: PHYSICAL THERAPY | Age: 43
Setting detail: THERAPIES SERIES
Discharge: HOME | End: 2023-09-19
Payer: COMMERCIAL

## 2023-09-19 PROCEDURE — 97113 AQUATIC THERAPY/EXERCISES: CPT

## 2023-09-21 ENCOUNTER — HOSPITAL ENCOUNTER (OUTPATIENT)
Dept: PHYSICAL THERAPY | Age: 43
Setting detail: THERAPIES SERIES
Discharge: HOME | End: 2023-09-21
Payer: COMMERCIAL

## 2023-09-21 NOTE — PROGRESS NOTES
Jonel Padron  Phone: [phone] Fax: [phone]        Physical Therapy Aquatic Flow Sheet     Date 2023  Date:  2023    Patient Name:  Donavon Jimenez    :  1980  Restrictions/Precautions:    Diagnosis:  pain of L hip, pain of R hip  Treatment Diagnosis:    Insurance/Certification information:  [address] [facility] [address] / Plan: [address] [place] [address] / Product Type: *No Product type* /   Insurance ID: [id number] - (Medicare Managed)  Referring Physician:  : Matilde Henderson DO     PCP: No primary care provider on file.   Plan of care signed (Y/N):    Visit# / total visits:    Pain level: 8/10   Electronically signed by:  Edna Kirkpatrick PTA  Time In:1315  Time Out:1415        Key  B= Belt DB= Dumbells T= Theratube   H= Hydrotone N= Noodles W= Weights   P= Paddles S= Speedo equipment K= Kickboard     Exercises/Activities   Warm-up/Amb  Minutes  Exercises  Minutes    Slow forward   5  HR/TR  5    Slow sideways  5  Marches  5    Slow backwards  5  Mini-squats  5    Medium forward    4-way SLR  5    Medium sideways    Hip circles/fig 8  5    Small shuffle    Hamstring curls  5    Jog    Knee extension  5    Braiding    Pelvic tilts  5    Bicycling  5  Scap squeezes          Shoulder flex/ext      Functional    Shoulder abd/add      Step    Shoulder H. abd/add      Lifting    Shoulder IR/ER      Hand to opp knee    Rowing      Push down squat    Bilateral pull down      UE PNF    Push/pull      LE PNF    Push downs      Wall push ups    Arm circles      SLS    Elbow flex/ext          Chin tuck      Stretching    UT shrugs/rolls      Gastroc/Soleus    Rocking horse      Hamstring          SKTC    Other      Piriformis          Hip flexor          Ladder pull          Pec stretch          Post deltoid           Timed Code Treatment Minutes:  60    Total Treatment Minutes:  60    Treatment/Activity Tolerance:  [] Patient tolerated treatment well [x]

## 2023-09-26 ENCOUNTER — HOSPITAL ENCOUNTER (OUTPATIENT)
Dept: PHYSICAL THERAPY | Age: 43
Setting detail: THERAPIES SERIES
Discharge: HOME | End: 2023-09-26
Payer: COMMERCIAL

## 2023-09-26 PROCEDURE — 97113 AQUATIC THERAPY/EXERCISES: CPT

## 2023-09-28 ENCOUNTER — HOSPITAL ENCOUNTER (OUTPATIENT)
Dept: PHYSICAL THERAPY | Age: 43
Setting detail: THERAPIES SERIES
End: 2023-09-28
Payer: COMMERCIAL

## 2023-09-28 NOTE — PROGRESS NOTES
Jonel Padron  Phone: [phone] Fax: [phone]        Physical Therapy Aquatic Flow Sheet     Date 2023  Date:  2023    Patient Name:  Karoline Bloom    :  1980  Restrictions/Precautions:    Diagnosis:  pain of L hip, pain of R hip  Treatment Diagnosis:    Insurance/Certification information:  [facility] / Plan: [address] [place] [address] / Product Type: *No Product type* /   Insurance ID: [id number] - (Medicare Managed)  Referring Physician:  : Cynthia Puga DO     PCP: No primary care provider on file.   Plan of care signed (Y/N):    Visit# / total visits:    Pain level: 8/10   Electronically signed by:  Migel Ware PTA  Time In:1315  Time Out:1415        Key  B= Belt DB= Dumbells T= Theratube   H= Hydrotone N= Noodles W= Weights   P= Paddles S= Speedo equipment K= Kickboard     Exercises/Activities   Warm-up/Amb  Minutes  Exercises  Minutes    Slow forward   5  HR/TR  5    Slow sideways  5  Marches  5    Slow backwards  5  Mini-squats  5    Medium forward    4-way SLR  5    Medium sideways    Hip circles/fig 8  5    Small shuffle    Hamstring curls  5    Jog    Knee extension  5    Braiding    Pelvic tilts  5    Bicycling  5  Scap squeezes          Shoulder flex/ext      Functional    Shoulder abd/add      Step    Shoulder H. abd/add      Lifting    Shoulder IR/ER      Hand to opp knee    Rowing      Push down squat    Bilateral pull down      UE PNF    Push/pull      LE PNF    Push downs      Wall push ups    Arm circles      SLS    Elbow flex/ext          Chin tuck      Stretching    UT shrugs/rolls      Gastroc/Soleus    Rocking horse      Hamstring          SKTC    Other      Piriformis          Hip flexor          Ladder pull          Pec stretch          Post deltoid           Timed Code Treatment Minutes:  60    Total Treatment Minutes:  60    Treatment/Activity Tolerance:  [] Patient tolerated treatment well [x]

## 2023-10-08 ENCOUNTER — APPOINTMENT (OUTPATIENT)
Dept: GENERAL RADIOLOGY | Age: 43
End: 2023-10-08
Payer: COMMERCIAL

## 2023-10-08 ENCOUNTER — HOSPITAL ENCOUNTER (EMERGENCY)
Age: 43
Discharge: HOME | End: 2023-10-08
Attending: EMERGENCY MEDICINE
Payer: COMMERCIAL

## 2023-10-08 VITALS
HEIGHT: 56 IN | WEIGHT: 110 LBS | BODY MASS INDEX: 24.75 KG/M2 | OXYGEN SATURATION: 100 % | SYSTOLIC BLOOD PRESSURE: 103 MMHG | HEART RATE: 76 BPM | RESPIRATION RATE: 18 BRPM | DIASTOLIC BLOOD PRESSURE: 72 MMHG | TEMPERATURE: 98 F

## 2023-10-08 DIAGNOSIS — U07.1 COVID-19: Primary | ICD-10-CM

## 2023-10-08 DIAGNOSIS — R19.7 DIARRHEA, UNSPECIFIED TYPE: ICD-10-CM

## 2023-10-08 DIAGNOSIS — R73.9 HYPERGLYCEMIA: ICD-10-CM

## 2023-10-08 LAB
ALBUMIN SERPL-MCNC: 4 G/DL (ref 3.5–5.2)
ALP SERPL-CCNC: 112 U/L (ref 35–104)
ALT SERPL-CCNC: 25 U/L (ref 0–32)
ANION GAP SERPL CALCULATED.3IONS-SCNC: 15 MMOL/L (ref 7–16)
AST SERPL-CCNC: 29 U/L (ref 0–31)
BASOPHILS # BLD: 0.03 K/UL (ref 0–0.2)
BASOPHILS NFR BLD: 0 % (ref 0–2)
BILIRUB SERPL-MCNC: 0.4 MG/DL (ref 0–1.2)
BUN SERPL-MCNC: 10 MG/DL (ref 6–20)
CALCIUM SERPL-MCNC: 9.5 MG/DL (ref 8.6–10.2)
CHLORIDE SERPL-SCNC: 95 MMOL/L (ref 98–107)
CO2 SERPL-SCNC: 21 MMOL/L (ref 22–29)
CREAT SERPL-MCNC: 0.6 MG/DL (ref 0.5–1)
EOSINOPHIL # BLD: 0.01 K/UL (ref 0.05–0.5)
EOSINOPHILS RELATIVE PERCENT: 0 % (ref 0–6)
ERYTHROCYTE [DISTWIDTH] IN BLOOD BY AUTOMATED COUNT: 14.1 % (ref 11.5–15)
GFR SERPL CREATININE-BSD FRML MDRD: >60 ML/MIN/1.73M2
GLUCOSE BLD-MCNC: 399 MG/DL
GLUCOSE BLD-MCNC: 399 MG/DL (ref 74–99)
GLUCOSE SERPL-MCNC: 494 MG/DL (ref 74–99)
HCT VFR BLD AUTO: 43.4 % (ref 34–48)
HGB BLD-MCNC: 14.1 G/DL (ref 11.5–15.5)
IMM GRANULOCYTES # BLD AUTO: 0.03 K/UL (ref 0–0.58)
IMM GRANULOCYTES NFR BLD: 0 % (ref 0–5)
LYMPHOCYTES NFR BLD: 1.18 K/UL (ref 1.5–4)
LYMPHOCYTES RELATIVE PERCENT: 18 % (ref 20–42)
MCH RBC QN AUTO: 25.5 PG (ref 26–35)
MCHC RBC AUTO-ENTMCNC: 32.5 G/DL (ref 32–34.5)
MCV RBC AUTO: 78.3 FL (ref 80–99.9)
MONOCYTES NFR BLD: 0.87 K/UL (ref 0.1–0.95)
MONOCYTES NFR BLD: 13 % (ref 2–12)
NEUTROPHILS NFR BLD: 69 % (ref 43–80)
NEUTS SEG NFR BLD: 4.6 K/UL (ref 1.8–7.3)
PLATELET # BLD AUTO: 264 K/UL (ref 130–450)
PMV BLD AUTO: 10.7 FL (ref 7–12)
POTASSIUM SERPL-SCNC: 3.6 MMOL/L (ref 3.5–5)
PROT SERPL-MCNC: 7.4 G/DL (ref 6.4–8.3)
RBC # BLD AUTO: 5.54 M/UL (ref 3.5–5.5)
SARS-COV-2 RDRP RESP QL NAA+PROBE: DETECTED
SODIUM SERPL-SCNC: 131 MMOL/L (ref 132–146)
SPECIMEN DESCRIPTION: ABNORMAL
TROPONIN I SERPL HS-MCNC: 8 NG/L (ref 0–9)
WBC OTHER # BLD: 6.7 K/UL (ref 4.5–11.5)

## 2023-10-08 PROCEDURE — 82962 GLUCOSE BLOOD TEST: CPT

## 2023-10-08 PROCEDURE — 87635 SARS-COV-2 COVID-19 AMP PRB: CPT

## 2023-10-08 PROCEDURE — 2580000003 HC RX 258: Performed by: EMERGENCY MEDICINE

## 2023-10-08 PROCEDURE — 99285 EMERGENCY DEPT VISIT HI MDM: CPT

## 2023-10-08 PROCEDURE — 93005 ELECTROCARDIOGRAM TRACING: CPT | Performed by: EMERGENCY MEDICINE

## 2023-10-08 PROCEDURE — 80053 COMPREHEN METABOLIC PANEL: CPT

## 2023-10-08 PROCEDURE — 84484 ASSAY OF TROPONIN QUANT: CPT

## 2023-10-08 PROCEDURE — 71045 X-RAY EXAM CHEST 1 VIEW: CPT

## 2023-10-08 PROCEDURE — 85025 COMPLETE CBC W/AUTO DIFF WBC: CPT

## 2023-10-08 RX ORDER — 0.9 % SODIUM CHLORIDE 0.9 %
500 INTRAVENOUS SOLUTION INTRAVENOUS ONCE
Status: COMPLETED | OUTPATIENT
Start: 2023-10-08 | End: 2023-10-08

## 2023-10-08 RX ORDER — 0.9 % SODIUM CHLORIDE 0.9 %
1000 INTRAVENOUS SOLUTION INTRAVENOUS ONCE
Status: COMPLETED | OUTPATIENT
Start: 2023-10-08 | End: 2023-10-08

## 2023-10-08 RX ORDER — 0.9 % SODIUM CHLORIDE 0.9 %
1000 INTRAVENOUS SOLUTION INTRAVENOUS ONCE
Status: DISCONTINUED | OUTPATIENT
Start: 2023-10-08 | End: 2023-10-08

## 2023-10-08 RX ADMIN — SODIUM CHLORIDE 1000 ML: 9 INJECTION, SOLUTION INTRAVENOUS at 18:11

## 2023-10-08 RX ADMIN — SODIUM CHLORIDE 500 ML: 9 INJECTION, SOLUTION INTRAVENOUS at 18:15

## 2023-10-08 ASSESSMENT — ENCOUNTER SYMPTOMS
DIARRHEA: 1
SHORTNESS OF BREATH: 0
EYE REDNESS: 0
ABDOMINAL PAIN: 0
NAUSEA: 0
VOMITING: 0

## 2023-10-08 ASSESSMENT — PAIN - FUNCTIONAL ASSESSMENT: PAIN_FUNCTIONAL_ASSESSMENT: NONE - DENIES PAIN

## 2023-10-08 NOTE — ED NOTES
Pt states she is upset we are taking too long to get her tests, reminded pt that staff came in first time to get lab work and she was not in room, she later stated that she was at her car getting her phone . When staff went in room again pt was on phone and stated \" she didn't like to be rushed\" so staff left to take care of another pt. Pt is now upset that she is \" waiting too long since she told us she was ready\" Attempted to explain to pt that staff moved on to next pt and we circled back after we finished with that pt to take car of her.  Pt was not receptive to this explanation Stating she lives \"right next to [facility] but doesn't go there because they take too long\" and that [facility] all she needs is a work note as work did not accept her home covid test\"      Collette Keating RN  10/08/23 7757

## 2023-10-08 NOTE — ED NOTES
Patient is recheck blood sugar was 399 however patient is asymptomatic she says she will just take insulin when she gets home she does not want to be admitted she wants to go home she does not want to wait any longer to have her blood sugar checked I discussed with her the the risk she will be discharged and she says she will follow-up with her family doctor soon as possible and have her blood sugar closely monitored no signs of DKA and patient is asymptomatic and vital signs are stable     Gladis Wade MD  10/08/23 9689

## 2023-10-09 LAB
EKG ATRIAL RATE: 123 BPM
EKG P AXIS: 68 DEGREES
EKG P-R INTERVAL: 132 MS
EKG Q-T INTERVAL: 322 MS
EKG QRS DURATION: 68 MS
EKG QTC CALCULATION (BAZETT): 460 MS
EKG R AXIS: 12 DEGREES
EKG T AXIS: 57 DEGREES
EKG VENTRICULAR RATE: 123 BPM

## 2023-10-09 PROCEDURE — 93010 ELECTROCARDIOGRAM REPORT: CPT | Performed by: INTERNAL MEDICINE

## 2024-02-29 ENCOUNTER — APPOINTMENT (OUTPATIENT)
Dept: CT IMAGING | Age: 44
End: 2024-02-29
Payer: COMMERCIAL

## 2024-02-29 ENCOUNTER — HOSPITAL ENCOUNTER (EMERGENCY)
Age: 44
Discharge: HOME | End: 2024-02-29
Attending: STUDENT IN AN ORGANIZED HEALTH CARE EDUCATION/TRAINING PROGRAM
Payer: COMMERCIAL

## 2024-02-29 VITALS
RESPIRATION RATE: 16 BRPM | SYSTOLIC BLOOD PRESSURE: 111 MMHG | DIASTOLIC BLOOD PRESSURE: 68 MMHG | TEMPERATURE: 97.8 F | OXYGEN SATURATION: 100 % | HEART RATE: 97 BPM

## 2024-02-29 DIAGNOSIS — J10.1 INFLUENZA B: Primary | ICD-10-CM

## 2024-02-29 DIAGNOSIS — R73.9 HYPERGLYCEMIA: ICD-10-CM

## 2024-02-29 LAB
ALBUMIN SERPL-MCNC: 4.1 G/DL (ref 3.5–5.2)
ALP SERPL-CCNC: 106 U/L (ref 35–104)
ALT SERPL-CCNC: 17 U/L (ref 0–32)
ANION GAP SERPL CALCULATED.3IONS-SCNC: 16 MMOL/L (ref 7–16)
AST SERPL-CCNC: 21 U/L (ref 0–31)
BASOPHILS # BLD: 0.01 K/UL (ref 0–0.2)
BASOPHILS NFR BLD: 0 % (ref 0–2)
BILIRUB SERPL-MCNC: 0.4 MG/DL (ref 0–1.2)
BUN SERPL-MCNC: 14 MG/DL (ref 6–20)
CALCIUM SERPL-MCNC: 9.4 MG/DL (ref 8.6–10.2)
CHLORIDE SERPL-SCNC: 95 MMOL/L (ref 98–107)
CO2 SERPL-SCNC: 22 MMOL/L (ref 22–29)
CREAT SERPL-MCNC: 0.7 MG/DL (ref 0.5–1)
EKG ATRIAL RATE: 116 BPM
EKG P AXIS: 66 DEGREES
EKG P-R INTERVAL: 138 MS
EKG Q-T INTERVAL: 332 MS
EKG QRS DURATION: 64 MS
EKG QTC CALCULATION (BAZETT): 461 MS
EKG R AXIS: 26 DEGREES
EKG T AXIS: 66 DEGREES
EKG VENTRICULAR RATE: 116 BPM
EOSINOPHIL # BLD: 0 K/UL (ref 0.05–0.5)
EOSINOPHILS RELATIVE PERCENT: 0 % (ref 0–6)
ERYTHROCYTE [DISTWIDTH] IN BLOOD BY AUTOMATED COUNT: 13.5 % (ref 11.5–15)
FLUAV RNA RESP QL NAA+PROBE: NOT DETECTED
FLUBV RNA RESP QL NAA+PROBE: DETECTED
GFR SERPL CREATININE-BSD FRML MDRD: >60 ML/MIN/1.73M2
GLUCOSE BLD-MCNC: 249 MG/DL (ref 74–99)
GLUCOSE BLD-MCNC: 325 MG/DL (ref 74–99)
GLUCOSE SERPL-MCNC: 390 MG/DL (ref 74–99)
HCT VFR BLD AUTO: 41.8 % (ref 34–48)
HGB BLD-MCNC: 13.9 G/DL (ref 11.5–15.5)
IMM GRANULOCYTES # BLD AUTO: 0.03 K/UL (ref 0–0.58)
IMM GRANULOCYTES NFR BLD: 1 % (ref 0–5)
LYMPHOCYTES NFR BLD: 0.86 K/UL (ref 1.5–4)
LYMPHOCYTES RELATIVE PERCENT: 14 % (ref 20–42)
MCH RBC QN AUTO: 25.8 PG (ref 26–35)
MCHC RBC AUTO-ENTMCNC: 33.3 G/DL (ref 32–34.5)
MCV RBC AUTO: 77.6 FL (ref 80–99.9)
MONOCYTES NFR BLD: 0.82 K/UL (ref 0.1–0.95)
MONOCYTES NFR BLD: 13 % (ref 2–12)
NEUTROPHILS NFR BLD: 72 % (ref 43–80)
NEUTS SEG NFR BLD: 4.5 K/UL (ref 1.8–7.3)
PLATELET # BLD AUTO: 261 K/UL (ref 130–450)
PMV BLD AUTO: 10.3 FL (ref 7–12)
POTASSIUM SERPL-SCNC: 4 MMOL/L (ref 3.5–5)
PROT SERPL-MCNC: 7.5 G/DL (ref 6.4–8.3)
RBC # BLD AUTO: 5.39 M/UL (ref 3.5–5.5)
RSV BY PCR: NOT DETECTED
SARS-COV-2 RNA RESP QL NAA+PROBE: NOT DETECTED
SODIUM SERPL-SCNC: 133 MMOL/L (ref 132–146)
SOURCE: ABNORMAL
SPECIMEN DESCRIPTION: ABNORMAL
SPECIMEN SOURCE: NORMAL
SPECIMEN SOURCE: NORMAL
STREP A, MOLECULAR: NEGATIVE
TROPONIN I SERPL HS-MCNC: 11 NG/L (ref 0–9)
TROPONIN I SERPL HS-MCNC: 7 NG/L (ref 0–9)
WBC OTHER # BLD: 6.2 K/UL (ref 4.5–11.5)

## 2024-02-29 PROCEDURE — 84484 ASSAY OF TROPONIN QUANT: CPT

## 2024-02-29 PROCEDURE — 93010 ELECTROCARDIOGRAM REPORT: CPT | Performed by: INTERNAL MEDICINE

## 2024-02-29 PROCEDURE — 80053 COMPREHEN METABOLIC PANEL: CPT

## 2024-02-29 PROCEDURE — 6360000002 HC RX W HCPCS: Performed by: STUDENT IN AN ORGANIZED HEALTH CARE EDUCATION/TRAINING PROGRAM

## 2024-02-29 PROCEDURE — 87651 STREP A DNA AMP PROBE: CPT

## 2024-02-29 PROCEDURE — 87636 SARSCOV2 & INF A&B AMP PRB: CPT

## 2024-02-29 PROCEDURE — 87634 RSV DNA/RNA AMP PROBE: CPT

## 2024-02-29 PROCEDURE — 2580000003 HC RX 258: Performed by: STUDENT IN AN ORGANIZED HEALTH CARE EDUCATION/TRAINING PROGRAM

## 2024-02-29 PROCEDURE — 71275 CT ANGIOGRAPHY CHEST: CPT

## 2024-02-29 PROCEDURE — 93005 ELECTROCARDIOGRAM TRACING: CPT | Performed by: STUDENT IN AN ORGANIZED HEALTH CARE EDUCATION/TRAINING PROGRAM

## 2024-02-29 PROCEDURE — 96374 THER/PROPH/DIAG INJ IV PUSH: CPT

## 2024-02-29 PROCEDURE — 85025 COMPLETE CBC W/AUTO DIFF WBC: CPT

## 2024-02-29 PROCEDURE — 99285 EMERGENCY DEPT VISIT HI MDM: CPT

## 2024-02-29 PROCEDURE — 96375 TX/PRO/DX INJ NEW DRUG ADDON: CPT

## 2024-02-29 PROCEDURE — 6360000004 HC RX CONTRAST MEDICATION: Performed by: RADIOLOGY

## 2024-02-29 PROCEDURE — 82962 GLUCOSE BLOOD TEST: CPT

## 2024-02-29 PROCEDURE — 6370000000 HC RX 637 (ALT 250 FOR IP): Performed by: STUDENT IN AN ORGANIZED HEALTH CARE EDUCATION/TRAINING PROGRAM

## 2024-02-29 PROCEDURE — 96361 HYDRATE IV INFUSION ADD-ON: CPT

## 2024-02-29 RX ORDER — KETOROLAC TROMETHAMINE 30 MG/ML
15 INJECTION, SOLUTION INTRAMUSCULAR; INTRAVENOUS ONCE
Status: COMPLETED | OUTPATIENT
Start: 2024-02-29 | End: 2024-02-29

## 2024-02-29 RX ORDER — ALBUTEROL SULFATE 90 UG/1
2 AEROSOL, METERED RESPIRATORY (INHALATION) 4 TIMES DAILY PRN
Qty: 18 G | Refills: 0 | Status: SHIPPED | OUTPATIENT
Start: 2024-02-29

## 2024-02-29 RX ORDER — DOXYCYCLINE HYCLATE 100 MG
100 TABLET ORAL 2 TIMES DAILY
Qty: 14 TABLET | Refills: 0 | Status: SHIPPED | OUTPATIENT
Start: 2024-02-29 | End: 2024-03-07

## 2024-02-29 RX ORDER — BROMPHENIRAMINE MALEATE, PSEUDOEPHEDRINE HYDROCHLORIDE, AND DEXTROMETHORPHAN HYDROBROMIDE 2; 30; 10 MG/5ML; MG/5ML; MG/5ML
5 SYRUP ORAL 4 TIMES DAILY PRN
Qty: 120 ML | Refills: 0 | Status: SHIPPED | OUTPATIENT
Start: 2024-02-29 | End: 2024-03-06

## 2024-02-29 RX ORDER — 0.9 % SODIUM CHLORIDE 0.9 %
1000 INTRAVENOUS SOLUTION INTRAVENOUS ONCE
Status: COMPLETED | OUTPATIENT
Start: 2024-02-29 | End: 2024-02-29

## 2024-02-29 RX ORDER — IPRATROPIUM BROMIDE AND ALBUTEROL SULFATE 2.5; .5 MG/3ML; MG/3ML
1 SOLUTION RESPIRATORY (INHALATION) ONCE
Status: COMPLETED | OUTPATIENT
Start: 2024-02-29 | End: 2024-02-29

## 2024-02-29 RX ADMIN — SODIUM CHLORIDE 1000 ML: 9 INJECTION, SOLUTION INTRAVENOUS at 10:56

## 2024-02-29 RX ADMIN — IPRATROPIUM BROMIDE AND ALBUTEROL SULFATE 1 DOSE: 2.5; .5 SOLUTION RESPIRATORY (INHALATION) at 13:33

## 2024-02-29 RX ADMIN — SODIUM CHLORIDE 1000 ML: 9 INJECTION, SOLUTION INTRAVENOUS at 13:34

## 2024-02-29 RX ADMIN — IOPAMIDOL 75 ML: 755 INJECTION, SOLUTION INTRAVENOUS at 12:13

## 2024-02-29 RX ADMIN — INSULIN HUMAN 6 UNITS: 100 INJECTION, SOLUTION PARENTERAL at 14:22

## 2024-02-29 RX ADMIN — KETOROLAC TROMETHAMINE 15 MG: 30 INJECTION, SOLUTION INTRAMUSCULAR; INTRAVENOUS at 10:56

## 2024-02-29 ASSESSMENT — PAIN SCALES - GENERAL: PAINLEVEL_OUTOF10: 7

## 2024-02-29 ASSESSMENT — ENCOUNTER SYMPTOMS
VOMITING: 0
PHOTOPHOBIA: 0
CHEST TIGHTNESS: 1
DIARRHEA: 0
WHEEZING: 1
ABDOMINAL DISTENTION: 0
NAUSEA: 0
SHORTNESS OF BREATH: 1
COUGH: 1
ABDOMINAL PAIN: 0

## 2024-02-29 NOTE — ED PROVIDER NOTES
Elissa Mcneal is a 43-year-old female present emergency department concern for cough, URI symptoms, body aches, fever, chills patient is also having chest pain and shortness of breath.  Patient feels that she is having diffuse chest tightness and shortness of breath.  Patient feels shortness of breath started today.  Patient denies any nausea, vomiting, abdominal pain.  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  Patient is not having nausea or vomiting patient states she has had several episodes of posttussive emesis.  Denies history of VTE, denies any known cardiac history    The history is provided by the patient and medical records.        Review of Systems   Constitutional:  Positive for chills, fatigue and fever. Negative for diaphoresis.   HENT:  Positive for congestion.    Eyes:  Negative for photophobia and visual disturbance.   Respiratory:  Positive for cough, chest tightness, shortness of breath and wheezing.    Cardiovascular:  Positive for chest pain. Negative for palpitations and leg swelling.   Gastrointestinal:  Negative for abdominal distention, abdominal pain, diarrhea, nausea and vomiting.   Genitourinary:  Negative for dysuria.   Musculoskeletal:  Negative for neck pain and neck stiffness.   Skin:  Negative for pallor and rash.   Neurological:  Negative for headaches.   Psychiatric/Behavioral:  Negative for confusion.         Physical Exam  Vitals and nursing note reviewed.   Constitutional:       General: She is not in acute distress.     Appearance: Normal appearance. She is not ill-appearing.   HENT:      Head: Normocephalic and atraumatic.   Eyes:      General: No scleral icterus.     Conjunctiva/sclera: Conjunctivae normal.      Pupils: Pupils are equal, round, and reactive to light.   Cardiovascular:      Rate and Rhythm: Regular rhythm. Tachycardia present.   Pulmonary:      Effort: Pulmonary effort is normal.      Breath sounds: Wheezing present.  k/uL    Lymphocytes Absolute 0.86 (L) 1.50 - 4.00 k/uL    Monocytes Absolute 0.82 0.10 - 0.95 k/uL    Eosinophils Absolute 0.00 (L) 0.05 - 0.50 k/uL    Basophils Absolute 0.01 0.00 - 0.20 k/uL    Absolute Immature Granulocyte 0.03 0.00 - 0.58 k/uL   Comprehensive Metabolic Panel w/ Reflex to MG   Result Value Ref Range    Sodium 133 132 - 146 mmol/L    Potassium 4.0 3.5 - 5.0 mmol/L    Chloride 95 (L) 98 - 107 mmol/L    CO2 22 22 - 29 mmol/L    Anion Gap 16 7 - 16 mmol/L    Glucose 390 (H) 74 - 99 mg/dL    BUN 14 6 - 20 mg/dL    Creatinine 0.7 0.50 - 1.00 mg/dL    Est, Glom Filt Rate >60 >60 mL/min/1.73m2    Calcium 9.4 8.6 - 10.2 mg/dL    Total Protein 7.5 6.4 - 8.3 g/dL    Albumin 4.1 3.5 - 5.2 g/dL    Total Bilirubin 0.4 0.0 - 1.2 mg/dL    Alkaline Phosphatase 106 (H) 35 - 104 U/L    ALT 17 0 - 32 U/L    AST 21 0 - 31 U/L   Troponin   Result Value Ref Range    Troponin, High Sensitivity 7 0 - 9 ng/L   Troponin   Result Value Ref Range    Troponin, High Sensitivity 11 (H) 0 - 9 ng/L   POCT Glucose   Result Value Ref Range    POC Glucose 325 (H) 74 - 99 mg/dL   POCT Glucose   Result Value Ref Range    POC Glucose 249 (H) 74 - 99 mg/dL   EKG 12 Lead   Result Value Ref Range    Ventricular Rate 116 BPM    Atrial Rate 116 BPM    P-R Interval 138 ms    QRS Duration 64 ms    Q-T Interval 332 ms    QTc Calculation (Bazett) 461 ms    P Axis 66 degrees    R Axis 26 degrees    T Axis 66 degrees       Radiology:  CTA PULMONARY W CONTRAST   Final Result   1. There is no pulmonary embolus   2. Ground-glass pneumonia seen within the inferior aspect of the right upper   lobe and within the left lower lobe.   .             ------------------------- NURSING NOTES AND VITALS REVIEWED ---------------------------  Date / Time Roomed:  2/29/2024 10:14 AM  ED Bed Assignment:  06/06    The nursing notes within the ED encounter and vital signs as below have been reviewed.   /68   Pulse 97   Temp 97.8 °F (36.6 °C) (Oral)   Resp

## 2024-02-29 NOTE — PROGRESS NOTES
Name: Elissa Mcneal  : 1980  MRN: [id number]    Date: 2024    Benefits of immediately proceeding with Radiology exam outweigh the risks and therefore the following is being waived:      [x] Pregnancy test    [] Protocol for Iodine allergy    [] MRI questionnaire    [] BUN/Creatinine        Carlita Kirk MD

## 2024-03-05 ENCOUNTER — OFFICE VISIT (OUTPATIENT)
Dept: INTERNAL MEDICINE | Age: 44
End: 2024-03-05
Payer: COMMERCIAL

## 2024-03-05 VITALS
SYSTOLIC BLOOD PRESSURE: 117 MMHG | DIASTOLIC BLOOD PRESSURE: 81 MMHG | HEART RATE: 133 BPM | WEIGHT: 105 LBS | HEIGHT: 56 IN | RESPIRATION RATE: 20 BRPM | OXYGEN SATURATION: 97 % | BODY MASS INDEX: 23.62 KG/M2

## 2024-03-05 DIAGNOSIS — Z13.9 SCREENING DUE: ICD-10-CM

## 2024-03-05 DIAGNOSIS — E78.5 HYPERLIPIDEMIA, UNSPECIFIED HYPERLIPIDEMIA TYPE: Primary | ICD-10-CM

## 2024-03-05 DIAGNOSIS — Z79.4 TYPE 2 DIABETES MELLITUS WITHOUT COMPLICATION, WITH LONG-TERM CURRENT USE OF INSULIN (HCC): ICD-10-CM

## 2024-03-05 DIAGNOSIS — E78.49 OTHER HYPERLIPIDEMIA: ICD-10-CM

## 2024-03-05 DIAGNOSIS — E11.9 TYPE 2 DIABETES MELLITUS WITHOUT COMPLICATION, WITH LONG-TERM CURRENT USE OF INSULIN (HCC): ICD-10-CM

## 2024-03-05 DIAGNOSIS — Z09 HOSPITAL DISCHARGE FOLLOW-UP: ICD-10-CM

## 2024-03-05 PROCEDURE — 99203 OFFICE O/P NEW LOW 30 MIN: CPT

## 2024-03-05 PROCEDURE — 1111F DSCHRG MED/CURRENT MED MERGE: CPT

## 2024-03-05 PROCEDURE — 99213 OFFICE O/P EST LOW 20 MIN: CPT

## 2024-03-05 ASSESSMENT — ENCOUNTER SYMPTOMS
SHORTNESS OF BREATH: 1
COUGH: 1
NAUSEA: 0
SORE THROAT: 1
CONSTIPATION: 0
RHINORRHEA: 1
PHOTOPHOBIA: 0
VOMITING: 0
EYE PAIN: 0
DIARRHEA: 0
ABDOMINAL PAIN: 0

## 2024-03-05 NOTE — PROGRESS NOTES
[facility]  Internal Medicine Residency Clinic    Attending Physician Statement  I have discussed the case, including pertinent history and exam findings with the resident physician.I have seen and examined the patient and the key elements of the encounter have been performed by me. ER record, labs and imaging studies reviewed. I agree with the assessment, plan and orders as documented by the resident. I have reviewed all pertinent PMHx, PSHx, FamHx, SocialHx, medications, and allergies and updated history as appropriate.     Patient here for emergency room follow up of medical problems.  Patient is also new to the clinic. Was seen in ER, for respiratory illness, had + influenza B.  Overall, she is improving and states today is the first day she can actually swallow. Still has some non-productive cough. Her medical hx is + for diabetes mellitus on insulin, and hyperlipidemia. Family hx + DM, mother and HTN, father. Has Hx congenital hip dysplasia and had hip surgery. Other surgery includes hysterectomy. Soc hs, smokes cigars (sometimes only a few puffs a day and never smokes a whole cigar in a day), and marijuana. She has no known drug allergies. Meds reviewed, she had previously been on atorvastatin but hasn't taken it in a long time, states her primary care doctor left the area and she never got refills.  Patient needs f/u visit to discuss, vaccines, screening, etc. Agree with need to check HIV, Hep C, fasting lipids etc.     Remainder of medical problems as per resident note.    Chin Escobar, DO  3/5/24

## 2024-03-05 NOTE — PROGRESS NOTES
Post-Discharge Transitional Care  Follow Up      Elissa Mcneal   YOB: 1980    Date of Office Visit:  3/5/2024  Date of Hospital Admission: 2/29/24  Date of Hospital Discharge: 2/29/24  Risk of hospital readmission (high >=14%. Medium >=10%) :No data recorded    Care management risk score Rising risk (score 2-5) and Complex Care (Scores >=6): No Risk Score On File     Non face to face  following discharge, date last encounter closed (first attempt may have been earlier): *No documented post hospital discharge outreach found in the last 14 days    Call initiated 2 business days of discharge: *No response recorded in the last 14 days    ASSESSMENT/PLAN:   {There are no diagnoses linked to this encounter. (Refresh or delete this SmartLink)}    Medical Decision Making: {TCMPN4:35706}  No follow-ups on file.    {Time Documentation Optional:[id number]}       Subjective:   HPI:  Follow up of Hospital problems/diagnosis(es): Influenza B    Inpatient course: Discharge summary reviewed- see chart.    Interval history/Current status: symptoms started on the 27th with dry cough, sore throat, body aches, fever, chills, chest pain and SOB. Went to the ED 2 days due to worsening of symptoms when she was prescribed albuterol inhaler (2 puffs 4 times a day), brompheniramine-pseudoephedrine syrup and doxycycline. Fever and chills resolved, but cough, chest pain and SOB are still present. Had 2 days of clear phlegm. Feeling very weak. Sick contact at work.     There is no problem list on file for this patient.      Medications listed as ordered at the time of discharge from hospital     Medication List            Accurate as of March 5, 2024  2:58 PM. If you have any questions, ask your nurse or doctor.                CONTINUE taking these medications      acetaminophen 500 MG tablet  Commonly known as: TYLENOL     albuterol sulfate  (90 Base) MCG/ACT inhaler  Commonly known as: Ventolin HFA  Inhale 2 puffs

## 2024-03-05 NOTE — PATIENT INSTRUCTIONS
Dear Elissa Mcneal,        Thank you for coming to your appointment today. I hope we have addressed all of your needs.       Please make sure to do the following:  - Continue your medications as listed.  - We will see each other again in 5 weeks      Have a great day!        Sincerely,  Jorge Pierce MD  3/5/2024  3:32 PM

## 2024-03-05 NOTE — PROGRESS NOTES
[facility] Internal Medicine      SUBJECTIVE:  Elissa Mcneal (:  1980) is a 43 y.o. female here for evaluation of the following chief complaint(s):  New Patient and Follow-Up from Hospital      Previous Visit Imp Points:   ED visit on  for URI symptoms with chest pain and SOB. Testing revealed influenza B and hyperglycemia; discharged on albuterol inhaler (2 puffs 4 times a day), brompheniramine-pseudoephedrine syrup and doxycycline    HPI:   Elissa Mcneal is a 43 y.o. female with PMH of DM, HLD, and congenital hip dysplasia s/p repair who presents to the clinic today for ED follow up. Symptoms started on the  with dry cough, sore throat, body aches, fever, chills, chest pain and SOB. Went to the ED 2 days later due to worsening of symptoms when she was prescribed albuterol inhaler (2 puffs 4 times a day), brompheniramine-pseudoephedrine syrup and doxycycline. Today, fever and chills resolved, but cough, sore throat, chest pain and SOB are still present. Had 2 days of clear phlegm. Feeling very weak. She smokes cigars (few puffs daily) and marijuana but hasn't smoked since the onset of symptoms.       Review of Systems   Constitutional:  Positive for fatigue. Negative for chills and fever.   HENT:  Positive for ear pain, rhinorrhea and sore throat.    Eyes:  Negative for photophobia and pain.   Respiratory:  Positive for cough and shortness of breath.    Cardiovascular:  Positive for chest pain. Negative for leg swelling.   Gastrointestinal:  Negative for abdominal pain, constipation, diarrhea, nausea and vomiting.   Genitourinary:  Negative for dysuria and frequency.   Neurological:  Negative for headaches.       PMHx:  has a past medical history of Birth defect, Diabetes mellitus (HCC), Ectopic pregnancy, and Hip dysplasia.     No Known Allergies     PSHx:  has a past surgical history that includes Hysterectomy and hip surgery.     Social Hx:   Social

## 2024-07-20 ENCOUNTER — APPOINTMENT (OUTPATIENT)
Dept: GENERAL RADIOLOGY | Age: 44
End: 2024-07-20
Payer: COMMERCIAL

## 2024-07-20 ENCOUNTER — HOSPITAL ENCOUNTER (EMERGENCY)
Age: 44
Discharge: HOME | End: 2024-07-20
Payer: COMMERCIAL

## 2024-07-20 VITALS
SYSTOLIC BLOOD PRESSURE: 136 MMHG | WEIGHT: 110 LBS | HEART RATE: 84 BPM | TEMPERATURE: 98.2 F | OXYGEN SATURATION: 100 % | BODY MASS INDEX: 24.75 KG/M2 | DIASTOLIC BLOOD PRESSURE: 88 MMHG | HEIGHT: 56 IN | RESPIRATION RATE: 19 BRPM

## 2024-07-20 DIAGNOSIS — S62.001A CLOSED NONDISPLACED FRACTURE OF SCAPHOID OF RIGHT WRIST, UNSPECIFIED PORTION OF SCAPHOID, INITIAL ENCOUNTER: Primary | ICD-10-CM

## 2024-07-20 PROCEDURE — 6370000000 HC RX 637 (ALT 250 FOR IP)

## 2024-07-20 PROCEDURE — 29125 APPL SHORT ARM SPLINT STATIC: CPT

## 2024-07-20 PROCEDURE — 99283 EMERGENCY DEPT VISIT LOW MDM: CPT

## 2024-07-20 PROCEDURE — 73130 X-RAY EXAM OF HAND: CPT

## 2024-07-20 PROCEDURE — 73110 X-RAY EXAM OF WRIST: CPT

## 2024-07-20 RX ORDER — IBUPROFEN 400 MG/1
400 TABLET ORAL ONCE
Status: COMPLETED | OUTPATIENT
Start: 2024-07-20 | End: 2024-07-20

## 2024-07-20 RX ORDER — NAPROXEN 500 MG/1
500 TABLET ORAL 2 TIMES DAILY PRN
Qty: 10 TABLET | Refills: 0 | Status: SHIPPED | OUTPATIENT
Start: 2024-07-20 | End: 2024-07-25

## 2024-07-20 RX ADMIN — IBUPROFEN 400 MG: 400 TABLET, FILM COATED ORAL at 19:52

## 2024-07-20 ASSESSMENT — ENCOUNTER SYMPTOMS
ALLERGIC/IMMUNOLOGIC NEGATIVE: 1
GASTROINTESTINAL NEGATIVE: 1
RESPIRATORY NEGATIVE: 1
EYES NEGATIVE: 1

## 2024-07-20 ASSESSMENT — PAIN DESCRIPTION - LOCATION: LOCATION: HAND

## 2024-07-20 ASSESSMENT — LIFESTYLE VARIABLES
HOW MANY STANDARD DRINKS CONTAINING ALCOHOL DO YOU HAVE ON A TYPICAL DAY: 3 OR 4
HOW OFTEN DO YOU HAVE A DRINK CONTAINING ALCOHOL: 2-4 TIMES A MONTH

## 2024-07-20 ASSESSMENT — PAIN DESCRIPTION - DESCRIPTORS: DESCRIPTORS: ACHING;STABBING;PRESSURE

## 2024-07-20 ASSESSMENT — PAIN DESCRIPTION - ORIENTATION: ORIENTATION: RIGHT

## 2024-07-20 ASSESSMENT — PAIN SCALES - GENERAL: PAINLEVEL_OUTOF10: 10

## 2024-07-20 ASSESSMENT — PAIN - FUNCTIONAL ASSESSMENT: PAIN_FUNCTIONAL_ASSESSMENT: 0-10
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
## 2024-07-22 DIAGNOSIS — S62.001A CLOSED NONDISPLACED FRACTURE OF SCAPHOID OF RIGHT WRIST, UNSPECIFIED PORTION OF SCAPHOID, INITIAL ENCOUNTER: Primary | ICD-10-CM

## 2024-07-24 ENCOUNTER — OFFICE VISIT (OUTPATIENT)
Dept: ORTHOPEDIC SURGERY | Age: 44
End: 2024-07-24
Payer: COMMERCIAL

## 2024-07-24 VITALS — WEIGHT: 110 LBS | BODY MASS INDEX: 24.75 KG/M2 | TEMPERATURE: 98 F | HEIGHT: 56 IN

## 2024-07-24 DIAGNOSIS — S63.501A SPRAIN OF RIGHT WRIST, INITIAL ENCOUNTER: Primary | ICD-10-CM

## 2024-07-24 PROCEDURE — 99203 OFFICE O/P NEW LOW 30 MIN: CPT | Performed by: ORTHOPAEDIC SURGERY
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
## 2024-08-22 ENCOUNTER — APPOINTMENT (OUTPATIENT)
Dept: CT IMAGING | Age: 44
End: 2024-08-22
Attending: EMERGENCY MEDICINE
Payer: COMMERCIAL

## 2024-08-22 ENCOUNTER — HOSPITAL ENCOUNTER (EMERGENCY)
Age: 44
Discharge: HOME | End: 2024-08-22
Attending: EMERGENCY MEDICINE
Payer: COMMERCIAL

## 2024-08-22 VITALS
HEART RATE: 79 BPM | OXYGEN SATURATION: 99 % | DIASTOLIC BLOOD PRESSURE: 68 MMHG | BODY MASS INDEX: 24.68 KG/M2 | TEMPERATURE: 98.2 F | WEIGHT: 110 LBS | SYSTOLIC BLOOD PRESSURE: 117 MMHG | RESPIRATION RATE: 19 BRPM

## 2024-08-22 DIAGNOSIS — R11.2 NAUSEA AND VOMITING, UNSPECIFIED VOMITING TYPE: ICD-10-CM

## 2024-08-22 DIAGNOSIS — R10.84 GENERALIZED ABDOMINAL PAIN: Primary | ICD-10-CM

## 2024-08-22 LAB
ALBUMIN SERPL-MCNC: 4.2 G/DL (ref 3.5–5.2)
ALP SERPL-CCNC: 101 U/L (ref 35–104)
ALT SERPL-CCNC: 11 U/L (ref 0–32)
ANION GAP SERPL CALCULATED.3IONS-SCNC: 13 MMOL/L (ref 7–16)
AST SERPL-CCNC: 13 U/L (ref 0–31)
BACTERIA URNS QL MICRO: ABNORMAL
BASOPHILS # BLD: 0.02 K/UL (ref 0–0.2)
BASOPHILS NFR BLD: 0 % (ref 0–2)
BILIRUB SERPL-MCNC: 0.3 MG/DL (ref 0–1.2)
BILIRUB UR QL STRIP: NEGATIVE
BUN SERPL-MCNC: 15 MG/DL (ref 6–20)
CALCIUM SERPL-MCNC: 9.4 MG/DL (ref 8.6–10.2)
CHLORIDE SERPL-SCNC: 98 MMOL/L (ref 98–107)
CLARITY UR: ABNORMAL
CO2 SERPL-SCNC: 26 MMOL/L (ref 22–29)
COLOR UR: YELLOW
CREAT SERPL-MCNC: 0.6 MG/DL (ref 0.5–1)
EOSINOPHIL # BLD: 0 K/UL (ref 0.05–0.5)
EOSINOPHILS RELATIVE PERCENT: 0 % (ref 0–6)
EPI CELLS #/AREA URNS HPF: ABNORMAL /HPF
ERYTHROCYTE [DISTWIDTH] IN BLOOD BY AUTOMATED COUNT: 13.5 % (ref 11.5–15)
GFR, ESTIMATED: >90 ML/MIN/1.73M2
GLUCOSE SERPL-MCNC: 204 MG/DL (ref 74–99)
GLUCOSE UR STRIP-MCNC: NEGATIVE MG/DL
HCT VFR BLD AUTO: 41.2 % (ref 34–48)
HGB BLD-MCNC: 13.3 G/DL (ref 11.5–15.5)
HGB UR QL STRIP.AUTO: NEGATIVE
IMM GRANULOCYTES # BLD AUTO: 0.06 K/UL (ref 0–0.58)
IMM GRANULOCYTES NFR BLD: 1 % (ref 0–5)
KETONES UR STRIP-MCNC: >80 MG/DL
LACTATE BLDV-SCNC: 1.7 MMOL/L (ref 0.5–2.2)
LEUKOCYTE ESTERASE UR QL STRIP: NEGATIVE
LIPASE SERPL-CCNC: 15 U/L (ref 13–60)
LYMPHOCYTES NFR BLD: 1.83 K/UL (ref 1.5–4)
LYMPHOCYTES RELATIVE PERCENT: 14 % (ref 20–42)
MCH RBC QN AUTO: 26.4 PG (ref 26–35)
MCHC RBC AUTO-ENTMCNC: 32.3 G/DL (ref 32–34.5)
MCV RBC AUTO: 81.7 FL (ref 80–99.9)
MONOCYTES NFR BLD: 0.52 K/UL (ref 0.1–0.95)
MONOCYTES NFR BLD: 4 % (ref 2–12)
MUCOUS THREADS URNS QL MICRO: PRESENT
NEUTROPHILS NFR BLD: 81 % (ref 43–80)
NEUTS SEG NFR BLD: 10.46 K/UL (ref 1.8–7.3)
NITRITE UR QL STRIP: NEGATIVE
PH UR STRIP: 6.5 [PH] (ref 5–9)
PLATELET # BLD AUTO: 331 K/UL (ref 130–450)
PMV BLD AUTO: 10.5 FL (ref 7–12)
POTASSIUM SERPL-SCNC: 4 MMOL/L (ref 3.5–5)
PROT SERPL-MCNC: 7.5 G/DL (ref 6.4–8.3)
PROT UR STRIP-MCNC: 30 MG/DL
RBC # BLD AUTO: 5.04 M/UL (ref 3.5–5.5)
RBC #/AREA URNS HPF: ABNORMAL /HPF
SODIUM SERPL-SCNC: 137 MMOL/L (ref 132–146)
SP GR UR STRIP: >1.03 (ref 1–1.03)
UROBILINOGEN UR STRIP-ACNC: 1 EU/DL (ref 0–1)
WBC #/AREA URNS HPF: ABNORMAL /HPF
WBC OTHER # BLD: 12.9 K/UL (ref 4.5–11.5)

## 2024-08-22 PROCEDURE — 6370000000 HC RX 637 (ALT 250 FOR IP): Performed by: EMERGENCY MEDICINE

## 2024-08-22 PROCEDURE — 96375 TX/PRO/DX INJ NEW DRUG ADDON: CPT

## 2024-08-22 PROCEDURE — 85025 COMPLETE CBC W/AUTO DIFF WBC: CPT

## 2024-08-22 PROCEDURE — 74177 CT ABD & PELVIS W/CONTRAST: CPT

## 2024-08-22 PROCEDURE — 96361 HYDRATE IV INFUSION ADD-ON: CPT

## 2024-08-22 PROCEDURE — 96374 THER/PROPH/DIAG INJ IV PUSH: CPT

## 2024-08-22 PROCEDURE — 6360000004 HC RX CONTRAST MEDICATION: Performed by: RADIOLOGY

## 2024-08-22 PROCEDURE — 81001 URINALYSIS AUTO W/SCOPE: CPT

## 2024-08-22 PROCEDURE — 80053 COMPREHEN METABOLIC PANEL: CPT

## 2024-08-22 PROCEDURE — 99285 EMERGENCY DEPT VISIT HI MDM: CPT

## 2024-08-22 PROCEDURE — 96372 THER/PROPH/DIAG INJ SC/IM: CPT

## 2024-08-22 PROCEDURE — 2580000003 HC RX 258: Performed by: EMERGENCY MEDICINE

## 2024-08-22 PROCEDURE — 83690 ASSAY OF LIPASE: CPT

## 2024-08-22 PROCEDURE — 6360000002 HC RX W HCPCS: Performed by: EMERGENCY MEDICINE

## 2024-08-22 PROCEDURE — 83605 ASSAY OF LACTIC ACID: CPT

## 2024-08-22 RX ORDER — METOCLOPRAMIDE HYDROCHLORIDE 5 MG/ML
10 INJECTION INTRAMUSCULAR; INTRAVENOUS ONCE
Status: COMPLETED | OUTPATIENT
Start: 2024-08-22 | End: 2024-08-22

## 2024-08-22 RX ORDER — HYDROCODONE BITARTRATE AND ACETAMINOPHEN 5; 325 MG/1; MG/1
1 TABLET ORAL ONCE
Status: COMPLETED | OUTPATIENT
Start: 2024-08-22 | End: 2024-08-22

## 2024-08-22 RX ORDER — ONDANSETRON 4 MG/1
4 TABLET, ORALLY DISINTEGRATING ORAL 3 TIMES DAILY PRN
Qty: 12 TABLET | Refills: 0 | Status: SHIPPED | OUTPATIENT
Start: 2024-08-22 | End: 2024-08-25 | Stop reason: SINTOL

## 2024-08-22 RX ORDER — 0.9 % SODIUM CHLORIDE 0.9 %
1000 INTRAVENOUS SOLUTION INTRAVENOUS ONCE
Status: COMPLETED | OUTPATIENT
Start: 2024-08-22 | End: 2024-08-22

## 2024-08-22 RX ORDER — ONDANSETRON 2 MG/ML
4 INJECTION INTRAMUSCULAR; INTRAVENOUS ONCE
Status: COMPLETED | OUTPATIENT
Start: 2024-08-22 | End: 2024-08-22

## 2024-08-22 RX ORDER — DICYCLOMINE HYDROCHLORIDE 10 MG/ML
20 INJECTION INTRAMUSCULAR ONCE
Status: COMPLETED | OUTPATIENT
Start: 2024-08-22 | End: 2024-08-22

## 2024-08-22 RX ORDER — SODIUM CHLORIDE 9 MG/ML
INJECTION, SOLUTION INTRAVENOUS CONTINUOUS
Status: CANCELLED | OUTPATIENT
Start: 2024-08-22

## 2024-08-22 RX ADMIN — DICYCLOMINE HYDROCHLORIDE 20 MG: 10 INJECTION, SOLUTION INTRAMUSCULAR at 14:21

## 2024-08-22 RX ADMIN — ONDANSETRON HYDROCHLORIDE 4 MG: 2 INJECTION, SOLUTION INTRAMUSCULAR; INTRAVENOUS at 08:40

## 2024-08-22 RX ADMIN — METOCLOPRAMIDE 10 MG: 5 INJECTION, SOLUTION INTRAMUSCULAR; INTRAVENOUS at 14:58

## 2024-08-22 RX ADMIN — IOPAMIDOL 75 ML: 755 INJECTION, SOLUTION INTRAVENOUS at 09:42

## 2024-08-22 RX ADMIN — HYDROCODONE BITARTRATE AND ACETAMINOPHEN 1 TABLET: 5; 325 TABLET ORAL at 08:39

## 2024-08-22 RX ADMIN — SODIUM CHLORIDE 1000 ML: 9 INJECTION, SOLUTION INTRAVENOUS at 08:39

## 2024-08-22 ASSESSMENT — LIFESTYLE VARIABLES
HOW OFTEN DO YOU HAVE A DRINK CONTAINING ALCOHOL: 2-4 TIMES A MONTH
HOW MANY STANDARD DRINKS CONTAINING ALCOHOL DO YOU HAVE ON A TYPICAL DAY: 1 OR 2

## 2024-08-22 NOTE — ED NOTES
Radiology Procedure Waiver   Name: Elissa Mcneal  : 1980  MRN: [id number]    Date:  24    Time: 8:18 AM EDT    Benefits of immediately proceeding with Radiology exam(s) without pre-testing outweigh the risks or are not indicated as specified below and therefore the following is/are being waived:    [x] Pregnancy test   [] Patients LMP on-time and regular.   [] Patient had Tubal Ligation or has other Contraception Device.   [] Patient  is Menopausal or Premenarcheal.    [x] Patient had Full or Partial Hysterectomy.    [] Protocol for Iodine allergy    [] MRI Questionnaire     [] BUN/Creatinine   [] Patient age w/no hx of renal dysfunction.   [] Patient on Dialysis.   [] Recent Normal Labs.  Electronically signed by Nolvia Jj MD on 24 at 8:18 AM EDT               Nolvia Jj MD  24 [id number]

## 2024-08-22 NOTE — ED PROVIDER NOTES
[facility] EMERGENCY DEPARTMENT  EMERGENCY DEPARTMENT ENCOUNTER        Pt Name: Elissa Mcneal  MRN: [id number]  Birthdate 1980  Date of evaluation: 8/22/2024  Provider: Penny Juárez MD  PCP: Jorge Carr MD  Note Started: 8:39 AM EDT 8/22/24    CHIEF COMPLAINT       Chief Complaint   Patient presents with    Abdominal Pain     Cramping in stomach started yesterday, can't hold any food down, N/V/D       HISTORY OF PRESENT ILLNESS: 1 or more Elements     Elissa Mcneal is a 43 y.o. female who presents abdominal pain that started 2 days ago.  Patient states that she came back from work at 6 AM,  she has been having diffuse abdominal pain.  She took Advil for it but threw up.  The medication did not help either.  Patient has vomited 8 times since 2 days ago when her symptoms started.  She has thrown up 3 times today.  The vomitus is nonbloody.  Patient also complains of nonbloody diarrhea.  Patient has not been able to eat since her symptoms started.  Patient is a known diabetic and is on Lantus.  She has been compliant with her medication but was not able to take the medication 1 day ago.  Patient rates her pain  a 10/10.  She states that the pain radiates to her lower back. She complains of fatigue.  Patient is lethargic.  She denies chest pain, headache, dizziness, vision changes, shortness of breath, vaginal bleeding, bloody stools and any other concerns.  Patient has a history of hysterectomy.        Nursing Notes were all reviewed and agreed with or any disagreements were addressed in the HPI.    ROS:   Pertinent positives and negatives are stated within HPI, all other systems reviewed and are negative.      --------------------------------------------- PAST HISTORY ---------------------------------------------  Past Medical History:  has a past medical history of Birth defect, Diabetes mellitus (HCC), Ectopic pregnancy, and Hip dysplasia.    Past 
impaction.             ------------------------- NURSING NOTES AND VITALS REVIEWED ---------------------------   The nursing notes within the ED encounter and vital signs as below have been reviewed.   /68   Pulse 79   Temp 98.2 °F (36.8 °C) (Oral)   Resp 19   Wt 49.9 kg (110 lb)   SpO2 99%   BMI 24.68 kg/m²   Oxygen Saturation Interpretation: Normal      ---------------------------------------------------PHYSICAL EXAM--------------------------------------      Constitutional/General: Alert and oriented x3, appears uncomfortable, non toxic in NAD  Head: Normocephalic and atraumatic  Mouth: Oropharynx clear, handling secretions, no trismus  Neck: Supple, full ROM,   Pulmonary: Lungs clear to auscultation bilaterally, no wheezes, rales, or rhonchi. Not in respiratory distress  Cardiovascular:  Regular rhythm, tachycardia  Abdomen: Soft, non distended, diffuse abdominal tenderness with voluntary guarding   Extremities: Moves all extremities x 4. Warm and well perfused  Skin: warm and dry without rash  Neurologic: GCS 15, no focal motor or sensory deficits   Psych: Normal Affect. Behavior normal.      ------------------------------ ED COURSE/MEDICAL DECISION MAKING----------------------  Medications   sodium chloride 0.9 % bolus 1,000 mL (0 mLs IntraVENous Stopped 8/22/24 0939)   ondansetron (ZOFRAN) injection 4 mg (4 mg IntraVENous Given 8/22/24 0840)   HYDROcodone-acetaminophen (NORCO) 5-325 MG per tablet 1 tablet (1 tablet Oral Given 8/22/24 0839)   iopamidol (ISOVUE-370) 76 % injection 75 mL (75 mLs IntraVENous Given 8/22/24 0012)   dicyclomine (BENTYL) injection 20 mg (20 mg IntraMUSCular Given 8/22/24 1421)   metoclopramide (REGLAN) injection 10 mg (10 mg IntraVENous Given 8/22/24 1953)       Medical Decision Making/ED COURSE:    History From: patient     Patient is a 43 y.o. female presenting to the ED for acute onset abdominal pain, moderate in severity. In the ED, patient was hemodynamically stable

## 2024-08-24 ENCOUNTER — HOSPITAL ENCOUNTER (EMERGENCY)
Age: 44
Discharge: HOME | End: 2024-08-25
Attending: STUDENT IN AN ORGANIZED HEALTH CARE EDUCATION/TRAINING PROGRAM
Payer: COMMERCIAL

## 2024-08-24 DIAGNOSIS — R11.2 NAUSEA AND VOMITING, UNSPECIFIED VOMITING TYPE: Primary | ICD-10-CM

## 2024-08-24 DIAGNOSIS — M54.50 ACUTE LEFT-SIDED LOW BACK PAIN, UNSPECIFIED WHETHER SCIATICA PRESENT: ICD-10-CM

## 2024-08-24 PROCEDURE — 99285 EMERGENCY DEPT VISIT HI MDM: CPT

## 2024-08-24 RX ORDER — 0.9 % SODIUM CHLORIDE 0.9 %
1000 INTRAVENOUS SOLUTION INTRAVENOUS ONCE
Status: COMPLETED | OUTPATIENT
Start: 2024-08-24 | End: 2024-08-25

## 2024-08-24 RX ORDER — ONDANSETRON 2 MG/ML
4 INJECTION INTRAMUSCULAR; INTRAVENOUS ONCE
Status: COMPLETED | OUTPATIENT
Start: 2024-08-24 | End: 2024-08-25

## 2024-08-24 ASSESSMENT — PAIN SCALES - GENERAL: PAINLEVEL_OUTOF10: 10

## 2024-08-24 ASSESSMENT — PAIN - FUNCTIONAL ASSESSMENT: PAIN_FUNCTIONAL_ASSESSMENT: 0-10

## 2024-08-25 ENCOUNTER — APPOINTMENT (OUTPATIENT)
Dept: CT IMAGING | Age: 44
End: 2024-08-25
Payer: COMMERCIAL

## 2024-08-25 VITALS
RESPIRATION RATE: 12 BRPM | DIASTOLIC BLOOD PRESSURE: 75 MMHG | TEMPERATURE: 97.6 F | OXYGEN SATURATION: 100 % | WEIGHT: 110 LBS | HEART RATE: 89 BPM | SYSTOLIC BLOOD PRESSURE: 116 MMHG | BODY MASS INDEX: 24.75 KG/M2 | HEIGHT: 56 IN

## 2024-08-25 LAB
ALBUMIN SERPL-MCNC: 4.7 G/DL (ref 3.5–5.2)
ALP SERPL-CCNC: 102 U/L (ref 35–104)
ALT SERPL-CCNC: 13 U/L (ref 0–32)
ANION GAP SERPL CALCULATED.3IONS-SCNC: 15 MMOL/L (ref 7–16)
AST SERPL-CCNC: 16 U/L (ref 0–31)
BACTERIA URNS QL MICRO: ABNORMAL
BASOPHILS # BLD: 0.03 K/UL (ref 0–0.2)
BASOPHILS NFR BLD: 0 % (ref 0–2)
BILIRUB SERPL-MCNC: 0.6 MG/DL (ref 0–1.2)
BILIRUB UR QL STRIP: ABNORMAL
BUN SERPL-MCNC: 20 MG/DL (ref 6–20)
CALCIUM SERPL-MCNC: 9.8 MG/DL (ref 8.6–10.2)
CHLORIDE SERPL-SCNC: 98 MMOL/L (ref 98–107)
CLARITY UR: CLEAR
CO2 SERPL-SCNC: 23 MMOL/L (ref 22–29)
COLOR UR: YELLOW
CREAT SERPL-MCNC: 0.7 MG/DL (ref 0.5–1)
D-DIMER QUANTITATIVE: <200 NG/ML DDU (ref 0–230)
EOSINOPHIL # BLD: 0.02 K/UL (ref 0.05–0.5)
EOSINOPHILS RELATIVE PERCENT: 0 % (ref 0–6)
EPI CELLS #/AREA URNS HPF: ABNORMAL /HPF
ERYTHROCYTE [DISTWIDTH] IN BLOOD BY AUTOMATED COUNT: 13.5 % (ref 11.5–15)
GFR, ESTIMATED: >90 ML/MIN/1.73M2
GLUCOSE SERPL-MCNC: 142 MG/DL (ref 74–99)
GLUCOSE UR STRIP-MCNC: NEGATIVE MG/DL
HCT VFR BLD AUTO: 46.8 % (ref 34–48)
HGB BLD-MCNC: 14.7 G/DL (ref 11.5–15.5)
HGB UR QL STRIP.AUTO: NEGATIVE
IMM GRANULOCYTES # BLD AUTO: 0.03 K/UL (ref 0–0.58)
IMM GRANULOCYTES NFR BLD: 0 % (ref 0–5)
KETONES UR STRIP-MCNC: >80 MG/DL
LACTATE BLDV-SCNC: 1.4 MMOL/L (ref 0.5–2.2)
LEUKOCYTE ESTERASE UR QL STRIP: NEGATIVE
LIPASE SERPL-CCNC: 42 U/L (ref 13–60)
LYMPHOCYTES NFR BLD: 2.82 K/UL (ref 1.5–4)
LYMPHOCYTES RELATIVE PERCENT: 27 % (ref 20–42)
MCH RBC QN AUTO: 25.5 PG (ref 26–35)
MCHC RBC AUTO-ENTMCNC: 31.4 G/DL (ref 32–34.5)
MCV RBC AUTO: 81.1 FL (ref 80–99.9)
MONOCYTES NFR BLD: 0.89 K/UL (ref 0.1–0.95)
MONOCYTES NFR BLD: 8 % (ref 2–12)
NEUTROPHILS NFR BLD: 64 % (ref 43–80)
NEUTS SEG NFR BLD: 6.78 K/UL (ref 1.8–7.3)
NITRITE UR QL STRIP: NEGATIVE
PH UR STRIP: 6 [PH] (ref 5–9)
PLATELET # BLD AUTO: 372 K/UL (ref 130–450)
PMV BLD AUTO: 9.9 FL (ref 7–12)
POTASSIUM SERPL-SCNC: 4.1 MMOL/L (ref 3.5–5)
PROT SERPL-MCNC: 8.5 G/DL (ref 6.4–8.3)
PROT UR STRIP-MCNC: 30 MG/DL
RBC # BLD AUTO: 5.77 M/UL (ref 3.5–5.5)
RBC #/AREA URNS HPF: ABNORMAL /HPF
SODIUM SERPL-SCNC: 136 MMOL/L (ref 132–146)
SP GR UR STRIP: >1.03 (ref 1–1.03)
TROPONIN I SERPL HS-MCNC: 10 NG/L (ref 0–9)
TROPONIN I SERPL HS-MCNC: 12 NG/L (ref 0–9)
UROBILINOGEN UR STRIP-ACNC: 0.2 EU/DL (ref 0–1)
WBC #/AREA URNS HPF: ABNORMAL /HPF
WBC OTHER # BLD: 10.6 K/UL (ref 4.5–11.5)

## 2024-08-25 PROCEDURE — 6360000004 HC RX CONTRAST MEDICATION: Performed by: RADIOLOGY

## 2024-08-25 PROCEDURE — 85379 FIBRIN DEGRADATION QUANT: CPT

## 2024-08-25 PROCEDURE — 6360000002 HC RX W HCPCS

## 2024-08-25 PROCEDURE — 6370000000 HC RX 637 (ALT 250 FOR IP): Performed by: STUDENT IN AN ORGANIZED HEALTH CARE EDUCATION/TRAINING PROGRAM

## 2024-08-25 PROCEDURE — 96374 THER/PROPH/DIAG INJ IV PUSH: CPT

## 2024-08-25 PROCEDURE — 83605 ASSAY OF LACTIC ACID: CPT

## 2024-08-25 PROCEDURE — 81001 URINALYSIS AUTO W/SCOPE: CPT

## 2024-08-25 PROCEDURE — 74177 CT ABD & PELVIS W/CONTRAST: CPT

## 2024-08-25 PROCEDURE — 85025 COMPLETE CBC W/AUTO DIFF WBC: CPT

## 2024-08-25 PROCEDURE — 83690 ASSAY OF LIPASE: CPT

## 2024-08-25 PROCEDURE — 2580000003 HC RX 258

## 2024-08-25 PROCEDURE — 93005 ELECTROCARDIOGRAM TRACING: CPT

## 2024-08-25 PROCEDURE — 84484 ASSAY OF TROPONIN QUANT: CPT

## 2024-08-25 PROCEDURE — 80053 COMPREHEN METABOLIC PANEL: CPT

## 2024-08-25 RX ORDER — ONDANSETRON 4 MG/1
4 TABLET, ORALLY DISINTEGRATING ORAL 3 TIMES DAILY PRN
Qty: 21 TABLET | Refills: 0 | Status: SHIPPED | OUTPATIENT
Start: 2024-08-25

## 2024-08-25 RX ORDER — DICYCLOMINE HYDROCHLORIDE 10 MG/1
10 CAPSULE ORAL ONCE
Status: COMPLETED | OUTPATIENT
Start: 2024-08-25 | End: 2024-08-25

## 2024-08-25 RX ORDER — ORPHENADRINE CITRATE 100 MG/1
100 TABLET, EXTENDED RELEASE ORAL ONCE
Status: COMPLETED | OUTPATIENT
Start: 2024-08-25 | End: 2024-08-25

## 2024-08-25 RX ORDER — ORPHENADRINE CITRATE 100 MG/1
100 TABLET, EXTENDED RELEASE ORAL 2 TIMES DAILY
Qty: 20 TABLET | Refills: 0 | Status: SHIPPED | OUTPATIENT
Start: 2024-08-25 | End: 2024-09-04

## 2024-08-25 RX ORDER — DICYCLOMINE HYDROCHLORIDE 10 MG/1
10 CAPSULE ORAL
Qty: 80 CAPSULE | Refills: 0 | Status: SHIPPED | OUTPATIENT
Start: 2024-08-25 | End: 2024-09-14

## 2024-08-25 RX ORDER — HYDROCODONE BITARTRATE AND ACETAMINOPHEN 5; 325 MG/1; MG/1
1 TABLET ORAL ONCE
Status: COMPLETED | OUTPATIENT
Start: 2024-08-25 | End: 2024-08-25

## 2024-08-25 RX ORDER — IOPAMIDOL 755 MG/ML
75 INJECTION, SOLUTION INTRAVASCULAR
Status: COMPLETED | OUTPATIENT
Start: 2024-08-25 | End: 2024-08-25

## 2024-08-25 RX ADMIN — ONDANSETRON 4 MG: 2 INJECTION INTRAMUSCULAR; INTRAVENOUS at 00:28

## 2024-08-25 RX ADMIN — HYDROCODONE BITARTRATE AND ACETAMINOPHEN 1 TABLET: 5; 325 TABLET ORAL at 01:19

## 2024-08-25 RX ADMIN — ORPHENADRINE CITRATE 100 MG: 100 TABLET, EXTENDED RELEASE ORAL at 06:04

## 2024-08-25 RX ADMIN — DICYCLOMINE HYDROCHLORIDE 10 MG: 10 CAPSULE ORAL at 01:19

## 2024-08-25 RX ADMIN — IOPAMIDOL 75 ML: 755 INJECTION, SOLUTION INTRAVENOUS at 03:23

## 2024-08-25 RX ADMIN — SODIUM CHLORIDE 1000 ML: 9 INJECTION, SOLUTION INTRAVENOUS at 00:27

## 2024-08-25 ASSESSMENT — PAIN DESCRIPTION - LOCATION: LOCATION: BACK;ABDOMEN

## 2024-08-25 ASSESSMENT — PAIN SCALES - GENERAL: PAINLEVEL_OUTOF10: 10

## 2024-08-25 NOTE — DISCHARGE INSTRUCTIONS
CT ABDOMEN PELVIS W IV CONTRAST Additional Contrast? None   Final Result   No acute intra-abdominal or pelvic process.           Do not drive or operate heavy machinery while taking muscle relaxer.  If symptoms worsen or concern arises return for re-evaluation.

## 2024-08-25 NOTE — ED PROVIDER NOTES
Department of Emergency Medicine  FIRST PROVIDER TRIAGE NOTE             Independent MLP           8/24/24  9:40 PM EDT    Date of Encounter: 8/24/24   MRN: [id number]      HPI: Elissa Mcneal is a 43 y.o. female who presents to the ED for Emesis (Since Thursday, was seen downtown ), Back Pain, and Abdominal Pain (States \"I was given some pills to stop me from throwing up but it is not working, nothing is working\")  Is now having low back pain, has not been able to keep anything down, and is having left low back pain.  She has not had a bowel movement for the last 4 days.    ROS: Negative for cp, sob, fever, diarrhea, urinary complaints, or dizziness.    PE: Gen Appearance/Constitutional: alert  CV: regular rate  GI: soft and NT    Provider-Patient relationship only established for Provider In Triage (PIT)  Full assessment, HPI and examination not performed, therefore, it is not yet possible to state whether or not an emergency medical condition exists   Focused assessment only during triage. This is not a comprehensive evaluation due to no physical ER space, staff shortage and high numbers of boarding patients in the ER     Initial Plan of Care: All treatment areas with department are currently occupied. Plan to order/Initiate the following while awaiting opening in ED: labs and IV fluids.  Initiate Treatment-Testing, Proceed toTreatment Area When Bed Available for ED Attending/MLP to Continue Care    Electronically signed by TAY Gooden CNP   DD: 8/24/24        Laura Riley APRN - CNP  08/24/24 [id number]

## 2024-08-25 NOTE — ED PROVIDER NOTES
Department of Emergency Medicine   ED  Provider Note  Admit Date/RoomTime: 8/24/2024 11:53 PM  ED Room: 14/14              [facility]     Elissa Mcneal is a 43 y.o. female with a PMHx significant for  DM, HLD  who presents for evaluation of abdominal pain / back pain, beginning prior to arrival.  The complaint has been persistent, moderate in severity, and worsened by nothing.   The patient states that she started develop lower back pain with nausea and vomiting 4 days prior to arrival.  States that she was evaluated for this at [facility], was told everything was okay and was discharged home on Zofran.  States that despite taking Zofran she still has been having nausea with vomiting low back pain.  States that she cannot keep anything down.     Review of Systems   Constitutional:  Negative for chills and fever.   HENT:  Negative for ear pain, sinus pressure and sore throat.    Eyes:  Negative for pain, discharge and redness.   Respiratory:  Negative for cough, shortness of breath and wheezing.    Cardiovascular:  Negative for chest pain.   Gastrointestinal:  Positive for abdominal pain, nausea and vomiting. Negative for abdominal distention and diarrhea.   Genitourinary:  Negative for dysuria and frequency.   Musculoskeletal:  Positive for back pain. Negative for arthralgias.   Skin:  Negative for rash and wound.   Neurological:  Negative for weakness and headaches.   Hematological:  Negative for adenopathy.   All other systems reviewed and are negative.       Physical Exam  Vitals and nursing note reviewed.   Constitutional:       General: She is not in acute distress.     Appearance: She is well-developed. She is not ill-appearing.   HENT:      Head: Normocephalic and atraumatic.      Right Ear: External ear normal.      Left Ear: External ear normal.   Eyes:      General:         Right eye: No discharge.         Left eye: No discharge.      Extraocular Movements: Extraocular movements intact.      Conjunctiva/sclera:  Conjunctivae normal.   Cardiovascular:      Rate and Rhythm: Normal rate and regular rhythm.      Heart sounds: Normal heart sounds. No murmur heard.  Pulmonary:      Effort: Pulmonary effort is normal. No respiratory distress.      Breath sounds: Normal breath sounds. No stridor.   Abdominal:      General: There is no distension.      Palpations: Abdomen is soft. There is no mass.      Tenderness: There is generalized abdominal tenderness. There is no guarding or rebound.      Hernia: No hernia is present.   Musculoskeletal:         General: No swelling or tenderness.      Cervical back: Normal range of motion and neck supple.   Skin:     General: Skin is warm and dry.      Coloration: Skin is not jaundiced or pale.   Neurological:      General: No focal deficit present.      Mental Status: She is alert.          Procedures    MDM       ED Course as of 08/30/24 1320   Sun Aug 25, 2024   0111 EKG:  This EKG is signed and interpreted by me.    Rate: 90  Rhythm: Normal sinus interpretation: Normal sinus, nonspecific EKG, no visible ST elevations or depressions, , QRS 60, QTc 445 9 comparison: Nonspecific changes [BB]      ED Course User Index  [BB] Dayton Garcia DO        Differential diagnosis: Electrolyte abnormality, gastroenteritis, diverticulitis, appendicitis to name a few  Review of ED/ Outpatient Records: On chart review patient was Evaluated on 08/22/2024, diagnosed with generalized abdominal pain, nausea, vomiting at that point in time CT ab pelvis demonstrating multifocal increased colonic fecal material consistent with fecal stasis, no evidence of rectal fecal impaction, no evidence of acute intra-abdominal or pelvic process.  Historians that case was discussed with: none  My EKG interpretation: See ED course  Imaging Non-plain film images such as CT, Ultrasound and MRI are read by the radiologist. Plain radiographic images are visualized and preliminarily interpreted by the ED  been smoking cigars and cigarettes. She has never used smokeless tobacco. She reports current alcohol use. She reports current drug use. Drug: Marijuana (Weed).    Family History: family history is not on file.     The patient’s home medications have been reviewed.    Allergies: Patient has no known allergies.    -------------------------------------------------- RESULTS -------------------------------------------------  Labs:  Results for orders placed or performed during the hospital encounter of 08/24/24   CBC with Auto Differential   Result Value Ref Range    WBC 10.6 4.5 - 11.5 k/uL    RBC 5.77 (H) 3.50 - 5.50 m/uL    Hemoglobin 14.7 11.5 - 15.5 g/dL    Hematocrit 46.8 34.0 - 48.0 %    MCV 81.1 80.0 - 99.9 fL    MCH 25.5 (L) 26.0 - 35.0 pg    MCHC 31.4 (L) 32.0 - 34.5 g/dL    RDW 13.5 11.5 - 15.0 %    Platelets 372 130 - 450 k/uL    MPV 9.9 7.0 - 12.0 fL    Neutrophils % 64 43.0 - 80.0 %    Lymphocytes % 27 20.0 - 42.0 %    Monocytes % 8 2.0 - 12.0 %    Eosinophils % 0 0 - 6 %    Basophils % 0 0.0 - 2.0 %    Immature Granulocytes % 0 0.0 - 5.0 %    Neutrophils Absolute 6.78 1.80 - 7.30 k/uL    Lymphocytes Absolute 2.82 1.50 - 4.00 k/uL    Monocytes Absolute 0.89 0.10 - 0.95 k/uL    Eosinophils Absolute 0.02 (L) 0.05 - 0.50 k/uL    Basophils Absolute 0.03 0.00 - 0.20 k/uL    Immature Granulocytes Absolute 0.03 0.00 - 0.58 k/uL   Comprehensive Metabolic Panel w/ Reflex to MG   Result Value Ref Range    Sodium 136 132 - 146 mmol/L    Potassium 4.1 3.5 - 5.0 mmol/L    Chloride 98 98 - 107 mmol/L    CO2 23 22 - 29 mmol/L    Anion Gap 15 7 - 16 mmol/L    Glucose 142 (H) 74 - 99 mg/dL    BUN 20 6 - 20 mg/dL    Creatinine 0.7 0.50 - 1.00 mg/dL    Est, Glom Filt Rate >90 >60 mL/min/1.73m2    Calcium 9.8 8.6 - 10.2 mg/dL    Total Protein 8.5 (H) 6.4 - 8.3 g/dL    Albumin 4.7 3.5 - 5.2 g/dL    Total Bilirubin 0.6 0.0 - 1.2 mg/dL    Alkaline Phosphatase 102 35 - 104 U/L    ALT 13 0 - 32 U/L    AST 16 0 - 31 U/L   Lipase

## 2024-08-26 LAB
EKG ATRIAL RATE: 90 BPM
EKG P AXIS: 75 DEGREES
EKG P-R INTERVAL: 170 MS
EKG Q-T INTERVAL: 372 MS
EKG QRS DURATION: 60 MS
EKG QTC CALCULATION (BAZETT): 455 MS
EKG R AXIS: 25 DEGREES
EKG T AXIS: 49 DEGREES
EKG VENTRICULAR RATE: 90 BPM

## 2024-08-26 PROCEDURE — 93010 ELECTROCARDIOGRAM REPORT: CPT | Performed by: INTERNAL MEDICINE

## 2024-12-26 ENCOUNTER — HOSPITAL ENCOUNTER (EMERGENCY)
Age: 44
Discharge: HOME | End: 2024-12-26
Payer: COMMERCIAL

## 2024-12-26 VITALS
HEART RATE: 89 BPM | SYSTOLIC BLOOD PRESSURE: 141 MMHG | HEIGHT: 56 IN | BODY MASS INDEX: 25.19 KG/M2 | WEIGHT: 112 LBS | OXYGEN SATURATION: 99 % | DIASTOLIC BLOOD PRESSURE: 90 MMHG | RESPIRATION RATE: 18 BRPM | TEMPERATURE: 98.2 F

## 2024-12-26 DIAGNOSIS — R30.0 DYSURIA: ICD-10-CM

## 2024-12-26 DIAGNOSIS — E11.65 HYPERGLYCEMIA DUE TO DIABETES MELLITUS (HCC): Primary | ICD-10-CM

## 2024-12-26 LAB
ALBUMIN SERPL-MCNC: 4.3 G/DL (ref 3.5–5.2)
ALP SERPL-CCNC: 126 U/L (ref 35–104)
ALT SERPL-CCNC: 14 U/L (ref 0–32)
ANION GAP SERPL CALCULATED.3IONS-SCNC: 11 MMOL/L (ref 7–16)
AST SERPL-CCNC: 12 U/L (ref 0–31)
B-OH-BUTYR SERPL-MCNC: 0.11 MMOL/L (ref 0.02–0.27)
BASOPHILS # BLD: 0.02 K/UL (ref 0–0.2)
BASOPHILS NFR BLD: 0 % (ref 0–2)
BILIRUB SERPL-MCNC: 0.2 MG/DL (ref 0–1.2)
BILIRUB UR QL STRIP: NEGATIVE
BUN SERPL-MCNC: 13 MG/DL (ref 6–20)
CALCIUM SERPL-MCNC: 9.7 MG/DL (ref 8.6–10.2)
CHLORIDE SERPL-SCNC: 95 MMOL/L (ref 98–107)
CHP ED QC CHECK: NORMAL
CHP ED QC CHECK: NORMAL
CLARITY UR: CLEAR
CO2 SERPL-SCNC: 24 MMOL/L (ref 22–29)
COLOR UR: YELLOW
COMMENT: ABNORMAL
CREAT SERPL-MCNC: 0.7 MG/DL (ref 0.5–1)
EOSINOPHIL # BLD: 0.05 K/UL (ref 0.05–0.5)
EOSINOPHILS RELATIVE PERCENT: 1 % (ref 0–6)
ERYTHROCYTE [DISTWIDTH] IN BLOOD BY AUTOMATED COUNT: 13 % (ref 11.5–15)
GFR, ESTIMATED: >90 ML/MIN/1.73M2
GLUCOSE BLD-MCNC: 387 MG/DL (ref 74–99)
GLUCOSE BLD-MCNC: 448 MG/DL
GLUCOSE BLD-MCNC: 448 MG/DL (ref 74–99)
GLUCOSE BLD-MCNC: >500 MG/DL (ref 74–99)
GLUCOSE BLD-MCNC: NORMAL MG/DL
GLUCOSE SERPL-MCNC: 527 MG/DL (ref 74–99)
GLUCOSE UR STRIP-MCNC: >=1000 MG/DL
HCT VFR BLD AUTO: 40.3 % (ref 34–48)
HGB BLD-MCNC: 13 G/DL (ref 11.5–15.5)
HGB UR QL STRIP.AUTO: NEGATIVE
IMM GRANULOCYTES # BLD AUTO: 0.03 K/UL (ref 0–0.58)
IMM GRANULOCYTES NFR BLD: 0 % (ref 0–5)
KETONES UR STRIP-MCNC: NEGATIVE MG/DL
LACTATE BLDV-SCNC: 2.3 MMOL/L (ref 0.5–2.2)
LEUKOCYTE ESTERASE UR QL STRIP: NEGATIVE
LYMPHOCYTES NFR BLD: 2.58 K/UL (ref 1.5–4)
LYMPHOCYTES RELATIVE PERCENT: 29 % (ref 20–42)
MAGNESIUM SERPL-MCNC: 2.1 MG/DL (ref 1.6–2.6)
MCH RBC QN AUTO: 25.2 PG (ref 26–35)
MCHC RBC AUTO-ENTMCNC: 32.3 G/DL (ref 32–34.5)
MCV RBC AUTO: 78.3 FL (ref 80–99.9)
MONOCYTES NFR BLD: 0.57 K/UL (ref 0.1–0.95)
MONOCYTES NFR BLD: 6 % (ref 2–12)
NEUTROPHILS NFR BLD: 64 % (ref 43–80)
NEUTS SEG NFR BLD: 5.66 K/UL (ref 1.8–7.3)
NITRITE UR QL STRIP: NEGATIVE
PH UR STRIP: 6.5 [PH] (ref 5–9)
PH VENOUS: 7.36 (ref 7.35–7.45)
PLATELET # BLD AUTO: 353 K/UL (ref 130–450)
PMV BLD AUTO: 10.7 FL (ref 7–12)
POTASSIUM SERPL-SCNC: 4.4 MMOL/L (ref 3.5–5)
PROT SERPL-MCNC: 7.4 G/DL (ref 6.4–8.3)
PROT UR STRIP-MCNC: NEGATIVE MG/DL
RBC # BLD AUTO: 5.15 M/UL (ref 3.5–5.5)
SODIUM SERPL-SCNC: 130 MMOL/L (ref 132–146)
SP GR UR STRIP: 1.01 (ref 1–1.03)
UROBILINOGEN UR STRIP-ACNC: 0.2 EU/DL (ref 0–1)
WBC OTHER # BLD: 8.9 K/UL (ref 4.5–11.5)

## 2024-12-26 PROCEDURE — 93005 ELECTROCARDIOGRAM TRACING: CPT | Performed by: NURSE PRACTITIONER

## 2024-12-26 PROCEDURE — 6370000000 HC RX 637 (ALT 250 FOR IP): Performed by: NURSE PRACTITIONER

## 2024-12-26 PROCEDURE — 82010 KETONE BODYS QUAN: CPT

## 2024-12-26 PROCEDURE — 96360 HYDRATION IV INFUSION INIT: CPT

## 2024-12-26 PROCEDURE — 2580000003 HC RX 258: Performed by: NURSE PRACTITIONER

## 2024-12-26 PROCEDURE — 83735 ASSAY OF MAGNESIUM: CPT

## 2024-12-26 PROCEDURE — 81003 URINALYSIS AUTO W/O SCOPE: CPT

## 2024-12-26 PROCEDURE — 99284 EMERGENCY DEPT VISIT MOD MDM: CPT

## 2024-12-26 PROCEDURE — 80053 COMPREHEN METABOLIC PANEL: CPT

## 2024-12-26 PROCEDURE — 85025 COMPLETE CBC W/AUTO DIFF WBC: CPT

## 2024-12-26 PROCEDURE — 82800 BLOOD PH: CPT

## 2024-12-26 PROCEDURE — 87086 URINE CULTURE/COLONY COUNT: CPT

## 2024-12-26 PROCEDURE — 83605 ASSAY OF LACTIC ACID: CPT

## 2024-12-26 PROCEDURE — 82947 ASSAY GLUCOSE BLOOD QUANT: CPT

## 2024-12-26 PROCEDURE — 96372 THER/PROPH/DIAG INJ SC/IM: CPT

## 2024-12-26 RX ORDER — 0.9 % SODIUM CHLORIDE 0.9 %
1000 INTRAVENOUS SOLUTION INTRAVENOUS ONCE
Status: COMPLETED | OUTPATIENT
Start: 2024-12-26 | End: 2024-12-26

## 2024-12-26 RX ORDER — IBUPROFEN 800 MG/1
800 TABLET, FILM COATED ORAL EVERY 8 HOURS PRN
Qty: 21 TABLET | Refills: 0 | Status: SHIPPED | OUTPATIENT
Start: 2024-12-26 | End: 2025-01-02

## 2024-12-26 RX ORDER — INSULIN LISPRO 100 [IU]/ML
6 INJECTION, SOLUTION INTRAVENOUS; SUBCUTANEOUS ONCE
Status: COMPLETED | OUTPATIENT
Start: 2024-12-26 | End: 2024-12-26

## 2024-12-26 RX ORDER — CEPHALEXIN 500 MG/1
500 CAPSULE ORAL 3 TIMES DAILY
Qty: 21 CAPSULE | Refills: 0 | Status: SHIPPED | OUTPATIENT
Start: 2024-12-26 | End: 2025-01-02

## 2024-12-26 RX ORDER — CEPHALEXIN 500 MG/1
500 CAPSULE ORAL ONCE
Status: COMPLETED | OUTPATIENT
Start: 2024-12-26 | End: 2024-12-26

## 2024-12-26 RX ADMIN — SODIUM CHLORIDE 1000 ML: 9 INJECTION, SOLUTION INTRAVENOUS at 02:13

## 2024-12-26 RX ADMIN — INSULIN LISPRO 6 UNITS: 100 INJECTION, SOLUTION INTRAVENOUS; SUBCUTANEOUS at 03:49

## 2024-12-26 RX ADMIN — CEPHALEXIN 500 MG: 500 CAPSULE ORAL at 03:50

## 2024-12-26 ASSESSMENT — LIFESTYLE VARIABLES
HOW MANY STANDARD DRINKS CONTAINING ALCOHOL DO YOU HAVE ON A TYPICAL DAY: 1 OR 2
HOW OFTEN DO YOU HAVE A DRINK CONTAINING ALCOHOL: 2-4 TIMES A MONTH

## 2024-12-26 NOTE — ED PROVIDER NOTES
Alkaline Phosphatase 126 (H) 35 - 104 U/L    ALT 14 0 - 32 U/L    AST 12 0 - 31 U/L   pH, venous   Result Value Ref Range    pH, [place] 7.360 7.350 - 7.450   Magnesium   Result Value Ref Range    Magnesium 2.1 1.6 - 2.6 mg/dL   Lactic Acid   Result Value Ref Range    Lactic Acid 2.3 (H) 0.5 - 2.2 mmol/L   Beta-Hydroxybutyrate   Result Value Ref Range    Beta-Hydroxybutyrate 0.11 0.02 - 0.27 mmol/L   POCT Glucose   Result Value Ref Range    Glucose  mg/dL    QC OK? OK    POCT Glucose   Result Value Ref Range    POC Glucose >500 (HH) 74 - 99 mg/dL   EKG 12 Lead   Result Value Ref Range    Ventricular Rate 92 BPM    Atrial Rate 92 BPM    P-R Interval 146 ms    QRS Duration 72 ms    Q-T Interval 356 ms    QTc Calculation (Bazett) 440 ms    P Axis 35 degrees    R Axis 55 degrees    T Axis 47 degrees       RADIOLOGY:  Interpreted by Radiologist.  No orders to display       ------------------------- NURSING NOTES AND VITALS REVIEWED ---------------------------   The nursing notes within the ED encounter and vital signs as below have been reviewed.   BP (!) 140/85   Pulse (!) 106   Temp 98.2 °F (36.8 °C) (Temporal)   Resp 18   Ht 1.422 m (4' 8\")   Wt 50.8 kg (112 lb)   SpO2 100%   BMI 25.11 kg/m²   Oxygen Saturation Interpretation: Normal      ---------------------------------------------------PHYSICAL EXAM--------------------------------------      Constitutional/General: Alert and oriented x3, well appearing, non toxic in NAD  Head: Normocephalic and atraumatic  Eyes: PERRL, EOMI  Mouth: Oropharynx clear, handling secretions, no trismus  Neck: Supple, full ROM,   Pulmonary: Lungs clear to auscultation bilaterally, no wheezes, rales, or rhonchi. Not in respiratory distress  Cardiovascular: Tachycardia rate and rhythm, no murmurs, gallops, or rubs. 2+ distal pulses  Abdomen: Soft, non tender, non distended,   Extremities: Moves all extremities x 4. Warm and well perfused  Skin: warm and dry without rash  Neurologic: GCS

## 2024-12-27 LAB
MICROORGANISM SPEC CULT: ABNORMAL
MICROORGANISM SPEC CULT: ABNORMAL
SERVICE CMNT-IMP: ABNORMAL
SPECIMEN DESCRIPTION: ABNORMAL

## 2024-12-28 LAB
EKG ATRIAL RATE: 92 BPM
EKG P AXIS: 35 DEGREES
EKG P-R INTERVAL: 146 MS
EKG Q-T INTERVAL: 356 MS
EKG QRS DURATION: 72 MS
EKG QTC CALCULATION (BAZETT): 440 MS
EKG R AXIS: 55 DEGREES
EKG T AXIS: 47 DEGREES
EKG VENTRICULAR RATE: 92 BPM

## 2024-12-28 PROCEDURE — 93010 ELECTROCARDIOGRAM REPORT: CPT | Performed by: INTERNAL MEDICINE

## 2025-06-25 ENCOUNTER — HOSPITAL ENCOUNTER (EMERGENCY)
Age: 45
Discharge: HOME | End: 2025-06-25
Payer: COMMERCIAL

## 2025-06-25 VITALS
BODY MASS INDEX: 24.66 KG/M2 | DIASTOLIC BLOOD PRESSURE: 90 MMHG | RESPIRATION RATE: 15 BRPM | TEMPERATURE: 97.5 F | SYSTOLIC BLOOD PRESSURE: 104 MMHG | OXYGEN SATURATION: 100 % | HEART RATE: 86 BPM | WEIGHT: 110 LBS

## 2025-06-25 DIAGNOSIS — N30.00 ACUTE CYSTITIS WITHOUT HEMATURIA: Primary | ICD-10-CM

## 2025-06-25 LAB
BACTERIA URNS QL MICRO: ABNORMAL
BILIRUB UR QL STRIP: NEGATIVE
CLARITY UR: CLEAR
COLOR UR: YELLOW
GLUCOSE UR STRIP-MCNC: >=1000 MG/DL
HCG UR QL: NEGATIVE
HGB UR QL STRIP.AUTO: NEGATIVE
KETONES UR STRIP-MCNC: ABNORMAL MG/DL
LEUKOCYTE ESTERASE UR QL STRIP: NEGATIVE
MUCOUS THREADS URNS QL MICRO: PRESENT
NITRITE UR QL STRIP: NEGATIVE
PH UR STRIP: 6 [PH] (ref 5–8)
PROT UR STRIP-MCNC: ABNORMAL MG/DL
RBC #/AREA URNS HPF: ABNORMAL /HPF
SP GR UR STRIP: 1.02 (ref 1–1.03)
UROBILINOGEN UR STRIP-ACNC: 0.2 EU/DL (ref 0–1)
WBC #/AREA URNS HPF: ABNORMAL /HPF

## 2025-06-25 PROCEDURE — 81001 URINALYSIS AUTO W/SCOPE: CPT

## 2025-06-25 PROCEDURE — 84703 CHORIONIC GONADOTROPIN ASSAY: CPT

## 2025-06-25 PROCEDURE — 99283 EMERGENCY DEPT VISIT LOW MDM: CPT

## 2025-06-25 RX ORDER — CEFDINIR 300 MG/1
300 CAPSULE ORAL 2 TIMES DAILY
Qty: 14 CAPSULE | Refills: 0 | Status: SHIPPED | OUTPATIENT
Start: 2025-06-25 | End: 2025-07-02

## 2025-06-25 RX ORDER — CEFDINIR 300 MG/1
300 CAPSULE ORAL EVERY 12 HOURS SCHEDULED
Status: DISCONTINUED | OUTPATIENT
Start: 2025-06-25 | End: 2025-06-25 | Stop reason: HOSPADM

## 2025-06-25 ASSESSMENT — PAIN SCALES - GENERAL: PAINLEVEL_OUTOF10: 6

## 2025-06-25 ASSESSMENT — PAIN - FUNCTIONAL ASSESSMENT: PAIN_FUNCTIONAL_ASSESSMENT: 0-10

## 2025-06-25 ASSESSMENT — PAIN DESCRIPTION - DESCRIPTORS: DESCRIPTORS: CRAMPING;DULL;DISCOMFORT

## 2025-06-25 NOTE — ED PROVIDER NOTES
Independent LOCO Visit.         Department of Emergency Medicine   ED  Provider Note  Admit Date/RoomTime: 6/25/2025  4:43 PM  ED Room: 14/14 6/25/25  4:49 PM EDT      HISTORY OF PRESENT ILLNESS:  (Nurses Notes Reviewed)    Chief Complaint:   Urinary Tract Infection (Thinks she has a uti, light discharge, x couple days. Has a hx of them)          Elissa Mcneal is a 44 y.o. old female presenting to the emergency department for concerns for urinary tract infection, which occurred prior to arrival.  Since onset the symptoms have been constant and mild-moderate in severity.  Symptoms are associated with abdominal pain and urinary frequency and denies any back pain, chills, cloudy urine, constipation, diarrhea, dysuria, headache, hematuria, sweating, vaginal itching, vomiting, or :additional complaints.      Review of Systems:   Pertinent positives and negatives are stated within HPI, all other systems reviewed and are negative.          --------------------------------------------- PAST HISTORY ---------------------------------------------  Past Medical History:  has a past medical history of Birth defect, Diabetes mellitus (HCC), Ectopic pregnancy, and Hip dysplasia.    Past Surgical History:  has a past surgical history that includes Hysterectomy and hip surgery.    Social History:  reports that she has been smoking cigars and cigarettes. She has never used smokeless tobacco. She reports current alcohol use. She reports current drug use. Drug: Marijuana (Weed).    Family History: family history is not on file.     The patient’s home medications have been reviewed.    Allergies: Patient has no known allergies.        ---------------------------------------------------PHYSICAL EXAM--------------------------------------    Constitutional/General: Alert and oriented x3, well appearing, non toxic in NAD  Head: Normocephalic and atraumatic  Eyes: PERRL, EOMI  Mouth: Oropharynx clear, handling

## 2025-07-07 ENCOUNTER — TELEPHONE (OUTPATIENT)
Dept: PHYSICAL THERAPY | Age: 45
End: 2025-07-07

## 2025-07-22 ENCOUNTER — HOSPITAL ENCOUNTER (OUTPATIENT)
Dept: PHYSICAL THERAPY | Age: 45
Setting detail: THERAPIES SERIES
Discharge: HOME | End: 2025-07-22
Payer: COMMERCIAL

## 2025-07-22 DIAGNOSIS — M54.50 LOW BACK PAIN, UNSPECIFIED BACK PAIN LATERALITY, UNSPECIFIED CHRONICITY, UNSPECIFIED WHETHER SCIATICA PRESENT: Primary | ICD-10-CM

## 2025-07-22 PROCEDURE — 97161 PT EVAL LOW COMPLEX 20 MIN: CPT | Performed by: PHYSICAL THERAPIST

## 2025-07-22 NOTE — THERAPY EVALUATION
[facility] [place] [facility]  Phone: [phone] Fax: [phone]            Date:  2025   Patient: Elissa Mcneal  : 1980  MRN: [id number]  Referring Provider: Rod Fulton DO  [address]  [place] [id number]     Medical Diagnosis:     Hip dysplasia   Back pain    SUBJECTIVE:     Onset date: years    Onset: Sudden onset    Mechanism of Injury: Pt reports that she has B hip dysplasia since birth. She reports incr in pain c prolonged walking, standing, and sitting incr her pain. She reports pain reduction c medication. She would like to perform aquatic therapy and has had progress c this before.    Previous PT: yes - helped    Medical Management for Current Problem: meloxicam    Chief complaint: pain, morning pain / stiffness, pain that worsens as day wears on , edema, decreased motion, decreased mobility, weakness    Behavior: condition is waxing / waning    Pain: waxing and waning  Current: 0/10     Best: 0/10     Worst:10/10    Symptom Type/Quality: sharp, dull, aching  Location:: Back: lumbar region, midline over the spine, right lateral side, and left lateral side worse on R         Provoking Activities/Positions: sitting, standing, walking, prolonged sitting, prolonged standing, walking long distances, being in one position too long , bending                 Relieving Activitie/Positions: medication, hot baths    Disturbed Sleep: yes  Bladder Dysfunction: no  Bowel Dysfunction: no     Imaging results: No results found.    Past Medical History:  Past Medical History:   Diagnosis Date    Birth defect     Diabetes mellitus (HCC)     Ectopic pregnancy     Hip dysplasia      Past Surgical History:   Procedure Laterality Date    HIP SURGERY      HYSTERECTOMY (CERVIX STATUS UNKNOWN)         Medications:   Current Outpatient Medications   Medication Sig Dispense Refill    ibuprofen (IBU) 800 MG tablet Take 1 tablet by mouth every 8 hours as needed for Pain 21 tablet 0

## 2025-08-01 ENCOUNTER — TELEPHONE (OUTPATIENT)
Dept: PHYSICAL THERAPY | Age: 45
End: 2025-08-01

## 2025-08-07 ENCOUNTER — HOSPITAL ENCOUNTER (OUTPATIENT)
Dept: PHYSICAL THERAPY | Age: 45
Setting detail: THERAPIES SERIES
Discharge: HOME | End: 2025-08-07
Payer: COMMERCIAL

## 2025-08-07 PROCEDURE — 97113 AQUATIC THERAPY/EXERCISES: CPT

## 2025-08-12 ENCOUNTER — HOSPITAL ENCOUNTER (OUTPATIENT)
Dept: PHYSICAL THERAPY | Age: 45
Setting detail: THERAPIES SERIES
Discharge: HOME | End: 2025-08-12
Payer: COMMERCIAL

## 2025-08-12 PROCEDURE — 97113 AQUATIC THERAPY/EXERCISES: CPT

## 2025-08-14 ENCOUNTER — HOSPITAL ENCOUNTER (OUTPATIENT)
Dept: PHYSICAL THERAPY | Age: 45
Setting detail: THERAPIES SERIES
Discharge: HOME | End: 2025-08-14
Payer: COMMERCIAL

## 2025-08-14 PROCEDURE — 97113 AQUATIC THERAPY/EXERCISES: CPT

## 2025-08-21 ENCOUNTER — HOSPITAL ENCOUNTER (EMERGENCY)
Age: 45
Discharge: HOME | End: 2025-08-21
Payer: COMMERCIAL

## 2025-08-21 ENCOUNTER — APPOINTMENT (OUTPATIENT)
Dept: GENERAL RADIOLOGY | Age: 45
End: 2025-08-21
Payer: COMMERCIAL

## 2025-08-21 VITALS
OXYGEN SATURATION: 98 % | HEART RATE: 106 BPM | BODY MASS INDEX: 26.99 KG/M2 | DIASTOLIC BLOOD PRESSURE: 89 MMHG | RESPIRATION RATE: 18 BRPM | WEIGHT: 120 LBS | TEMPERATURE: 97.3 F | HEIGHT: 56 IN | SYSTOLIC BLOOD PRESSURE: 103 MMHG

## 2025-08-21 DIAGNOSIS — M25.551 RIGHT HIP PAIN: Primary | ICD-10-CM

## 2025-08-21 PROCEDURE — 73502 X-RAY EXAM HIP UNI 2-3 VIEWS: CPT

## 2025-08-21 PROCEDURE — 6360000002 HC RX W HCPCS: Performed by: NURSE PRACTITIONER

## 2025-08-21 PROCEDURE — 96372 THER/PROPH/DIAG INJ SC/IM: CPT

## 2025-08-21 PROCEDURE — 99284 EMERGENCY DEPT VISIT MOD MDM: CPT

## 2025-08-21 RX ORDER — KETOROLAC TROMETHAMINE 10 MG/1
10 TABLET, FILM COATED ORAL EVERY 6 HOURS PRN
Qty: 12 TABLET | Refills: 0 | Status: SHIPPED | OUTPATIENT
Start: 2025-08-21 | End: 2025-08-24

## 2025-08-21 RX ORDER — KETOROLAC TROMETHAMINE 30 MG/ML
30 INJECTION, SOLUTION INTRAMUSCULAR; INTRAVENOUS ONCE
Status: COMPLETED | OUTPATIENT
Start: 2025-08-21 | End: 2025-08-21

## 2025-08-21 RX ADMIN — KETOROLAC TROMETHAMINE 30 MG: 30 INJECTION, SOLUTION INTRAMUSCULAR at 01:46

## 2025-08-21 ASSESSMENT — PAIN - FUNCTIONAL ASSESSMENT
PAIN_FUNCTIONAL_ASSESSMENT: PREVENTS OR INTERFERES SOME ACTIVE ACTIVITIES AND ADLS
PAIN_FUNCTIONAL_ASSESSMENT: 0-10
PAIN_FUNCTIONAL_ASSESSMENT: 0-10

## 2025-08-21 ASSESSMENT — PAIN DESCRIPTION - DESCRIPTORS
DESCRIPTORS: BURNING;PRESSURE
DESCRIPTORS: ACHING;BURNING;STABBING

## 2025-08-21 ASSESSMENT — PAIN DESCRIPTION - ORIENTATION
ORIENTATION: RIGHT
ORIENTATION: RIGHT

## 2025-08-21 ASSESSMENT — PAIN SCALES - GENERAL
PAINLEVEL_OUTOF10: 10
PAINLEVEL_OUTOF10: 10

## 2025-08-21 ASSESSMENT — PAIN DESCRIPTION - LOCATION
LOCATION: HIP
LOCATION: HIP

## 2025-08-26 ENCOUNTER — HOSPITAL ENCOUNTER (OUTPATIENT)
Dept: PHYSICAL THERAPY | Age: 45
Setting detail: THERAPIES SERIES
Discharge: HOME | End: 2025-08-26
Payer: COMMERCIAL

## 2025-08-26 PROCEDURE — 97113 AQUATIC THERAPY/EXERCISES: CPT

## 2025-08-28 ENCOUNTER — HOSPITAL ENCOUNTER (OUTPATIENT)
Dept: PHYSICAL THERAPY | Age: 45
Setting detail: THERAPIES SERIES
Discharge: HOME | End: 2025-08-28
Payer: COMMERCIAL

## 2025-08-28 PROCEDURE — 97113 AQUATIC THERAPY/EXERCISES: CPT
